# Patient Record
Sex: FEMALE | Race: ASIAN | Employment: OTHER | URBAN - METROPOLITAN AREA
[De-identification: names, ages, dates, MRNs, and addresses within clinical notes are randomized per-mention and may not be internally consistent; named-entity substitution may affect disease eponyms.]

---

## 2017-02-15 ENCOUNTER — APPOINTMENT (OUTPATIENT)
Dept: CT IMAGING | Facility: HOSPITAL | Age: 67
End: 2017-02-15
Attending: EMERGENCY MEDICINE
Payer: COMMERCIAL

## 2017-02-15 ENCOUNTER — APPOINTMENT (OUTPATIENT)
Dept: GENERAL RADIOLOGY | Facility: HOSPITAL | Age: 67
End: 2017-02-15
Attending: EMERGENCY MEDICINE
Payer: COMMERCIAL

## 2017-02-15 ENCOUNTER — HOSPITAL ENCOUNTER (EMERGENCY)
Facility: HOSPITAL | Age: 67
Discharge: HOME | End: 2017-02-15
Attending: EMERGENCY MEDICINE
Payer: COMMERCIAL

## 2017-02-15 VITALS
WEIGHT: 125 LBS | TEMPERATURE: 98 F | OXYGEN SATURATION: 98 % | DIASTOLIC BLOOD PRESSURE: 85 MMHG | HEART RATE: 88 BPM | RESPIRATION RATE: 18 BRPM | SYSTOLIC BLOOD PRESSURE: 132 MMHG | BODY MASS INDEX: 22.15 KG/M2 | HEIGHT: 63 IN
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
## 2017-02-15 DIAGNOSIS — R73.9 HYPERGLYCEMIA: ICD-10-CM

## 2017-02-15 DIAGNOSIS — S00.83XA FACIAL CONTUSION, INITIAL ENCOUNTER: ICD-10-CM

## 2017-02-15 LAB
ALBUMIN SERPL-MCNC: 3.9 G/DL (ref 3.5–4.8)
ALP LIVER SERPL-CCNC: 68 U/L (ref 50–130)
ALT SERPL-CCNC: 28 U/L (ref 14–54)
AST SERPL-CCNC: 29 U/L (ref 15–41)
BASOPHILS # BLD AUTO: 0.08 X10(3) UL (ref 0–0.1)
BASOPHILS NFR BLD AUTO: 1.1 %
BILIRUB SERPL-MCNC: 0.4 MG/DL (ref 0.1–2)
BILIRUB UR QL STRIP.AUTO: NEGATIVE
BUN BLD-MCNC: 14 MG/DL (ref 8–20)
CALCIUM BLD-MCNC: 9.5 MG/DL (ref 8.3–10.3)
CHLORIDE: 105 MMOL/L (ref 101–111)
CLARITY UR REFRACT.AUTO: CLEAR
CO2: 24 MMOL/L (ref 22–32)
COLOR UR AUTO: COLORLESS
CREAT BLD-MCNC: 0.92 MG/DL (ref 0.55–1.02)
EOSINOPHIL # BLD AUTO: 0.28 X10(3) UL (ref 0–0.3)
EOSINOPHIL NFR BLD AUTO: 3.8 %
ERYTHROCYTE [DISTWIDTH] IN BLOOD BY AUTOMATED COUNT: 11.7 % (ref 11.5–16)
GLUCOSE BLD-MCNC: 235 MG/DL (ref 70–99)
GLUCOSE UR STRIP.AUTO-MCNC: NEGATIVE MG/DL
HCT VFR BLD AUTO: 34.8 % (ref 34–50)
HGB BLD-MCNC: 12 G/DL (ref 12–16)
IMMATURE GRANULOCYTE COUNT: 0.03 X10(3) UL (ref 0–1)
IMMATURE GRANULOCYTE RATIO %: 0.4 %
INR BLD: 1.01 (ref 0.89–1.12)
KETONES UR STRIP.AUTO-MCNC: NEGATIVE MG/DL
LEUKOCYTE ESTERASE UR QL STRIP.AUTO: NEGATIVE
LYMPHOCYTES # BLD AUTO: 2.92 X10(3) UL (ref 0.9–4)
LYMPHOCYTES NFR BLD AUTO: 39.6 %
M PROTEIN MFR SERPL ELPH: 7.3 G/DL (ref 6.1–8.3)
MCH RBC QN AUTO: 30.1 PG (ref 27–33.2)
MCHC RBC AUTO-ENTMCNC: 34.5 G/DL (ref 31–37)
MCV RBC AUTO: 87.2 FL (ref 81–100)
MONOCYTES # BLD AUTO: 0.5 X10(3) UL (ref 0.1–0.6)
MONOCYTES NFR BLD AUTO: 6.8 %
NEUTROPHIL ABS PRELIM: 3.57 X10 (3) UL (ref 1.3–6.7)
NEUTROPHILS # BLD AUTO: 3.57 X10(3) UL (ref 1.3–6.7)
NEUTROPHILS NFR BLD AUTO: 48.3 %
NITRITE UR QL STRIP.AUTO: NEGATIVE
PH UR STRIP.AUTO: 7 [PH] (ref 4.5–8)
PLATELET # BLD AUTO: 314 10(3)UL (ref 150–450)
POTASSIUM SERPL-SCNC: 3.9 MMOL/L (ref 3.6–5.1)
PROT UR STRIP.AUTO-MCNC: NEGATIVE MG/DL
PSA SERPL DL<=0.01 NG/ML-MCNC: 13.6 SECONDS (ref 12.3–14.8)
RBC # BLD AUTO: 3.99 X10(6)UL (ref 3.8–5.1)
RBC UR QL AUTO: NEGATIVE
RED CELL DISTRIBUTION WIDTH-SD: 37.6 FL (ref 35.1–46.3)
SODIUM SERPL-SCNC: 138 MMOL/L (ref 136–144)
SP GR UR STRIP.AUTO: <1.005 (ref 1–1.03)
TROPONIN: <0.046 NG/ML (ref ?–0.05)
TSI SER-ACNC: 3.81 MIU/ML (ref 0.35–5.5)
UROBILINOGEN UR STRIP.AUTO-MCNC: <2 MG/DL
WBC # BLD AUTO: 7.4 X10(3) UL (ref 4–13)

## 2017-02-15 PROCEDURE — 76377 3D RENDER W/INTRP POSTPROCES: CPT

## 2017-02-15 PROCEDURE — 85025 COMPLETE CBC W/AUTO DIFF WBC: CPT | Performed by: EMERGENCY MEDICINE

## 2017-02-15 PROCEDURE — 99285 EMERGENCY DEPT VISIT HI MDM: CPT

## 2017-02-15 PROCEDURE — 72125 CT NECK SPINE W/O DYE: CPT

## 2017-02-15 PROCEDURE — 70486 CT MAXILLOFACIAL W/O DYE: CPT

## 2017-02-15 PROCEDURE — 99284 EMERGENCY DEPT VISIT MOD MDM: CPT

## 2017-02-15 PROCEDURE — 93010 ELECTROCARDIOGRAM REPORT: CPT

## 2017-02-15 PROCEDURE — 93005 ELECTROCARDIOGRAM TRACING: CPT

## 2017-02-15 PROCEDURE — 85610 PROTHROMBIN TIME: CPT | Performed by: EMERGENCY MEDICINE

## 2017-02-15 PROCEDURE — 70450 CT HEAD/BRAIN W/O DYE: CPT

## 2017-02-15 PROCEDURE — 84484 ASSAY OF TROPONIN QUANT: CPT | Performed by: EMERGENCY MEDICINE

## 2017-02-15 PROCEDURE — 84443 ASSAY THYROID STIM HORMONE: CPT | Performed by: EMERGENCY MEDICINE

## 2017-02-15 PROCEDURE — 80053 COMPREHEN METABOLIC PANEL: CPT | Performed by: EMERGENCY MEDICINE

## 2017-02-15 PROCEDURE — 81003 URINALYSIS AUTO W/O SCOPE: CPT | Performed by: EMERGENCY MEDICINE

## 2017-02-15 PROCEDURE — 36415 COLL VENOUS BLD VENIPUNCTURE: CPT

## 2017-02-15 RX ORDER — AMOXICILLIN AND CLAVULANATE POTASSIUM 500; 125 MG/1; MG/1
1 TABLET, FILM COATED ORAL 2 TIMES DAILY
COMMUNITY
End: 2020-09-30 | Stop reason: ALTCHOICE

## 2017-02-16 NOTE — ED PROVIDER NOTES
Patient Seen in: [facility] Emergency Department    History   Patient presents with:  Trauma (cardiovascular, musculoskeletal)    Stated Complaint: fall/head inj    HPI    The patient is a 81-year-old female with a history of Parkinson's disease pre agreed except as otherwise stated in HPI.     Physical Exam       ED Triage Vitals   BP 02/15/17 2136 140/86 mmHg   Pulse 02/15/17 2136 94   Resp 02/15/17 2136 18   Temp 02/15/17 2136 97.8 °F (36.6 °C)   Temp src 02/15/17 2136 Oral   SpO2 02/15/17 2136 98 % extension, and forearm pronation and supination. Distal pulses normal and symmetric  Skin: Small periorbital contusion on the right, but no lacerations or abrasions. No masses or nodules or abnormalities.   Psych: Normal interaction, cooperative with exam analgesics but she declined. Urine was collected for urinalysis and culture. Ice pack was applied to the contusion.   [facility]     Ct Brain Or Head ([id number])    2/15/2017  PROCEDURE:  CT BRAIN OR HEAD ([id number])  COMPARISON:  MUKESH THORNE BRAIN NIK SCAN WITH SPECT fell hitting Right side of head. Laceration to Right supraorbital area. FINDINGS:  SINUSES:  Bilateral maxillary mucosal thickening. NASAL FOSSA:   No mass, fracture, or significant septal deviation. SKULL BASE:  No bone destruction.   FACIAL B However she would prefer to defer any imaging as an outpatient with her PCP since she is not tender. She has normal active range of motion of her elbow and shoulder without any pain. I encouraged her to return if she does want x-ray imaging.   She states

## 2017-02-16 NOTE — ED INITIAL ASSESSMENT (HPI)
Fell on R side after losing balance, denies any LOC.  Hx of parkinsons dse. + R facial swelling & R wrist

## 2017-02-17 LAB
ATRIAL RATE: 92 BPM
P AXIS: 73 DEGREES
P-R INTERVAL: 136 MS
Q-T INTERVAL: 370 MS
QRS DURATION: 92 MS
QTC CALCULATION (BEZET): 457 MS
R AXIS: 61 DEGREES
T AXIS: 27 DEGREES
VENTRICULAR RATE: 92 BPM

## 2017-03-13 PROBLEM — G20 PARKINSON DISEASE (HCC): Status: ACTIVE | Noted: 2017-03-13

## 2017-03-13 PROBLEM — G20.A1 PARKINSON DISEASE (HCC): Status: ACTIVE | Noted: 2017-03-13

## 2017-03-13 PROBLEM — G20.A1 PARKINSON DISEASE: Status: ACTIVE | Noted: 2017-03-13

## 2017-12-02 ENCOUNTER — HOSPITAL ENCOUNTER (OUTPATIENT)
Dept: CT IMAGING | Facility: HOSPITAL | Age: 67
Discharge: HOME | End: 2017-12-02
Attending: SPECIALIST
Payer: MEDICARE

## 2017-12-02 DIAGNOSIS — S23.420A: ICD-10-CM

## 2017-12-02 PROCEDURE — 71250 CT THORAX DX C-: CPT | Performed by: SPECIALIST

## 2017-12-12 ENCOUNTER — HOSPITAL ENCOUNTER (OUTPATIENT)
Dept: MAMMOGRAPHY | Age: 67
Discharge: HOME | End: 2017-12-12
Attending: SPECIALIST
Payer: MEDICARE

## 2017-12-12 ENCOUNTER — HOSPITAL ENCOUNTER (OUTPATIENT)
Dept: BONE DENSITY | Age: 67
Discharge: HOME | End: 2017-12-12
Attending: SPECIALIST
Payer: MEDICARE

## 2017-12-12 DIAGNOSIS — Z12.31 ENCOUNTER FOR SCREENING MAMMOGRAM FOR MALIGNANT NEOPLASM OF BREAST: ICD-10-CM

## 2017-12-12 DIAGNOSIS — M81.0 OSTEOPOROSIS: ICD-10-CM

## 2017-12-12 PROCEDURE — 77080 DXA BONE DENSITY AXIAL: CPT | Performed by: SPECIALIST

## 2017-12-12 PROCEDURE — 77067 SCR MAMMO BI INCL CAD: CPT | Performed by: SPECIALIST

## 2018-10-11 ENCOUNTER — HOSPITAL ENCOUNTER (OUTPATIENT)
Dept: GENERAL RADIOLOGY | Age: 68
Discharge: HOME | End: 2018-10-11
Attending: SPECIALIST
Payer: MEDICARE

## 2018-10-11 DIAGNOSIS — M17.0 ARTHRITIS OF BOTH KNEES: ICD-10-CM

## 2018-10-11 PROCEDURE — 73560 X-RAY EXAM OF KNEE 1 OR 2: CPT | Performed by: SPECIALIST

## 2018-10-22 PROBLEM — M17.12 PRIMARY OSTEOARTHRITIS OF LEFT KNEE: Status: ACTIVE | Noted: 2018-10-22

## 2020-01-22 ENCOUNTER — HOSPITAL ENCOUNTER (OUTPATIENT)
Dept: BONE DENSITY | Age: 70
Discharge: HOME | End: 2020-01-22
Attending: SPECIALIST
Payer: MEDICARE

## 2020-01-22 ENCOUNTER — HOSPITAL ENCOUNTER (OUTPATIENT)
Dept: MAMMOGRAPHY | Age: 70
Discharge: HOME | End: 2020-01-22
Attending: SPECIALIST
Payer: MEDICARE

## 2020-01-22 DIAGNOSIS — M81.0 OSTEOPOROSIS: ICD-10-CM

## 2020-01-22 DIAGNOSIS — Z12.31 ENCOUNTER FOR SCREENING MAMMOGRAM FOR MALIGNANT NEOPLASM OF BREAST: ICD-10-CM

## 2020-01-22 PROCEDURE — 77067 SCR MAMMO BI INCL CAD: CPT | Performed by: SPECIALIST

## 2020-01-22 PROCEDURE — 77080 DXA BONE DENSITY AXIAL: CPT | Performed by: SPECIALIST

## 2020-01-22 PROCEDURE — 77063 BREAST TOMOSYNTHESIS BI: CPT | Performed by: SPECIALIST

## 2020-06-13 ENCOUNTER — HOSPITAL ENCOUNTER (OUTPATIENT)
Dept: MRI IMAGING | Facility: HOSPITAL | Age: 70
Discharge: HOME | End: 2020-06-13
Payer: MEDICARE

## 2020-06-13 DIAGNOSIS — M17.12 PRIMARY OSTEOARTHRITIS OF LEFT KNEE: ICD-10-CM

## 2020-06-13 DIAGNOSIS — M25.562 LEFT KNEE PAIN, UNSPECIFIED CHRONICITY: ICD-10-CM

## 2020-06-13 PROCEDURE — 73721 MRI JNT OF LWR EXTRE W/O DYE: CPT | Performed by: ORTHOPAEDIC SURGERY

## 2020-08-03 ENCOUNTER — TELEPHONE (OUTPATIENT)
Dept: PHYSICAL THERAPY | Age: 70
End: 2020-08-03

## 2020-08-05 ENCOUNTER — OFFICE VISIT (OUTPATIENT)
Dept: PHYSICAL THERAPY | Facility: HOSPITAL | Age: 70
End: 2020-08-05
Attending: SPECIALIST
Payer: MEDICARE

## 2020-08-05 PROCEDURE — 97162 PT EVAL MOD COMPLEX 30 MIN: CPT

## 2020-08-05 PROCEDURE — 97110 THERAPEUTIC EXERCISES: CPT

## 2020-08-05 NOTE — PROGRESS NOTES
LOWER EXTREMITY EVALUATION:   Referring Physician: Dr. Lyons Persons  Diagnosis: OA left knee.      Date of Service: 8/5/2020     PATIENT Omid Cannon is a 81year old female who presents to therapy today with complaints of a 3-4 year history of tenderness medial joint line left knee, patellar tendon, and medial border of patella. AROM: (* denotes performed with pain)  TL ROM while standing is wnls and pain-free. Hip Knee Foot/Ankle   Wfls throughout and pain-free.    Flexion: R 125; L 105*  E without UE assist and no pain. Pt will report standing tolerance with ADLs of > 30 minutes. Pt will be pain-free when going up/down a flight of stairs reciprocally. Pt will be independent with an upgraded HEP.      Frequency / Duration: Patient will b

## 2020-08-07 ENCOUNTER — APPOINTMENT (OUTPATIENT)
Dept: PHYSICAL THERAPY | Facility: HOSPITAL | Age: 70
End: 2020-08-07
Attending: SPECIALIST
Payer: MEDICARE

## 2020-08-12 ENCOUNTER — OFFICE VISIT (OUTPATIENT)
Dept: PHYSICAL THERAPY | Facility: HOSPITAL | Age: 70
End: 2020-08-12
Attending: SPECIALIST
Payer: MEDICARE

## 2020-08-12 PROCEDURE — 97140 MANUAL THERAPY 1/> REGIONS: CPT

## 2020-08-12 PROCEDURE — 97110 THERAPEUTIC EXERCISES: CPT

## 2020-08-12 NOTE — PROGRESS NOTES
Dx: OA left knee. Insurance (Authorized # of Visits):  Medicare HMO; 8 visits. Authorizing Physician: Dr. Sixto Mace MD visit: none scheduled/tbd. .. Fall Risk: standard         Precautions:  Hx of Parkinson's dx. ..              Shepherd QS, SLR, seated KF ROM. Charges: Mary Anglin.        Total Timed Treatment: 44 min  Total Treatment Time: 44 min

## 2020-08-14 ENCOUNTER — OFFICE VISIT (OUTPATIENT)
Dept: PHYSICAL THERAPY | Facility: HOSPITAL | Age: 70
End: 2020-08-14
Attending: SPECIALIST
Payer: MEDICARE

## 2020-08-14 PROCEDURE — 97110 THERAPEUTIC EXERCISES: CPT

## 2020-08-14 PROCEDURE — 97140 MANUAL THERAPY 1/> REGIONS: CPT

## 2020-08-14 NOTE — PROGRESS NOTES
Dx: OA left knee. Insurance (Authorized # of Visits):  Medicare HMO; 8 visits. Authorizing Physician: Dr. Serge Mace MD visit: none scheduled/tbd. .. Fall Risk: standard         Precautions:  Hx of Parkinson's dx. ..              Shepherd Supine L SLR x 10. Supine L SLR x 12. DKTC with SB 2 x 10. DKTC with SB 2 x 10. LTR with SB x 10 l/r. LTR with calves on SB x 12 l/r. .        Bridging with calves on SB x 10 reps. . Bridging with calves on SB 3 second holds x 12.

## 2020-08-19 ENCOUNTER — OFFICE VISIT (OUTPATIENT)
Dept: PHYSICAL THERAPY | Facility: HOSPITAL | Age: 70
End: 2020-08-19
Attending: SPECIALIST
Payer: MEDICARE

## 2020-08-19 PROCEDURE — 97140 MANUAL THERAPY 1/> REGIONS: CPT

## 2020-08-19 PROCEDURE — 97110 THERAPEUTIC EXERCISES: CPT

## 2020-08-19 NOTE — PROGRESS NOTES
Dx: OA left knee. Insurance (Authorized # of Visits):  Medicare HMO; 8 visits. Authorizing Physician: Dr. Ricardo Mace MD visit: none scheduled/tbd. .. Fall Risk: standard         Precautions:  Hx of Parkinson's dx. ..              Shepherd IT band. .. Grade II L PF medial and caudal mobs by PT. Grade II L PF medial and caudal mobs by PT. Grade III L PF medial and caudal glides by PT.        QS L:   With folded towel under knee 5 second holds x 10.    Towel under heel 5 second holds x 1

## 2020-08-21 ENCOUNTER — OFFICE VISIT (OUTPATIENT)
Dept: PHYSICAL THERAPY | Facility: HOSPITAL | Age: 70
End: 2020-08-21
Attending: SPECIALIST
Payer: MEDICARE

## 2020-08-21 PROCEDURE — 97110 THERAPEUTIC EXERCISES: CPT

## 2020-08-21 PROCEDURE — 97140 MANUAL THERAPY 1/> REGIONS: CPT

## 2020-08-21 NOTE — PROGRESS NOTES
Dx: OA left knee. Insurance (Authorized # of Visits):  Medicare HMO; 8 visits. Authorizing Physician: Dr. Yanet Mace MD visit: none scheduled/tbd. .. Fall Risk: standard         Precautions:  Hx of Parkinson's dx. ..              Shepherd including HS teondons, IT band, quad muscle. .. X. stm left knee area by PT including HS tendons, quad muscle, IT band. .. stm left knee area by PT for 3-4 minutes. Grade II L PF medial and caudal mobs by PT.   Grade II L PF medial and caudal mobs by PT

## 2020-08-26 ENCOUNTER — OFFICE VISIT (OUTPATIENT)
Dept: PHYSICAL THERAPY | Facility: HOSPITAL | Age: 70
End: 2020-08-26
Attending: SPECIALIST
Payer: MEDICARE

## 2020-08-26 PROCEDURE — 97140 MANUAL THERAPY 1/> REGIONS: CPT

## 2020-08-26 PROCEDURE — 97110 THERAPEUTIC EXERCISES: CPT

## 2020-08-26 NOTE — PROGRESS NOTES
Dx: OA left knee. Insurance (Authorized # of Visits):  Medicare HMO; 8 visits. Authorizing Physician: Dr. Ryan Mace MD visit: none scheduled/tbd. .. Fall Risk: standard         Precautions:  Hx of Parkinson's dx. ..           Disch minutes. Load 5 for 6 minutes. NuStep (7/9) load 5 for 6 minutes. NuStep load 5 for 6 minutes. stm left knee by PT including HS teondons, IT band, quad muscle. .. X. stm left knee area by PT including HS tendons, quad muscle, IT band. .. stm left kne sitting with PT assist.  L KF ROM while supine      L posterior tibial gliding grade II and III by PT while hook lying for 2-3 minutes. Grade III L posterior tibial glides by PT for 2-3 minutes. X.    Supine L KE mobs by PT.    Grade III L posterior tibia

## 2020-08-28 ENCOUNTER — APPOINTMENT (OUTPATIENT)
Dept: PHYSICAL THERAPY | Facility: HOSPITAL | Age: 70
End: 2020-08-28
Attending: SPECIALIST
Payer: MEDICARE

## 2020-09-30 RX ORDER — ACETAMINOPHEN 500 MG
1000 TABLET ORAL ONCE
Status: CANCELLED | OUTPATIENT
Start: 2020-09-30 | End: 2020-09-30

## 2020-10-07 ENCOUNTER — LAB ENCOUNTER (OUTPATIENT)
Dept: LAB | Facility: HOSPITAL | Age: 70
End: 2020-10-07
Attending: STUDENT IN AN ORGANIZED HEALTH CARE EDUCATION/TRAINING PROGRAM
Payer: MEDICARE

## 2020-10-07 DIAGNOSIS — E11.9 DM (DIABETES MELLITUS) (HCC): ICD-10-CM

## 2020-10-07 DIAGNOSIS — M17.0 PRIMARY OSTEOARTHRITIS OF BOTH KNEES: Primary | ICD-10-CM

## 2020-10-07 DIAGNOSIS — Z01.810 PREOP CARDIOVASCULAR EXAM: ICD-10-CM

## 2020-10-07 PROCEDURE — 87081 CULTURE SCREEN ONLY: CPT

## 2020-10-07 PROCEDURE — 85025 COMPLETE CBC W/AUTO DIFF WBC: CPT

## 2020-10-07 PROCEDURE — 36415 COLL VENOUS BLD VENIPUNCTURE: CPT

## 2020-10-07 PROCEDURE — 80053 COMPREHEN METABOLIC PANEL: CPT

## 2020-10-13 ENCOUNTER — LAB ENCOUNTER (OUTPATIENT)
Dept: LAB | Facility: HOSPITAL | Age: 70
End: 2020-10-13
Attending: SPECIALIST
Payer: MEDICARE

## 2020-10-13 DIAGNOSIS — Z01.818 PRE-OP TESTING: ICD-10-CM

## 2020-10-13 PROCEDURE — 86901 BLOOD TYPING SEROLOGIC RH(D): CPT

## 2020-10-13 PROCEDURE — 86850 RBC ANTIBODY SCREEN: CPT

## 2020-10-13 PROCEDURE — 86900 BLOOD TYPING SEROLOGIC ABO: CPT

## 2020-10-15 ENCOUNTER — LAB ENCOUNTER (OUTPATIENT)
Dept: LAB | Facility: HOSPITAL | Age: 70
End: 2020-10-15
Attending: SPECIALIST
Payer: MEDICARE

## 2020-10-15 DIAGNOSIS — E87.6 HYPOKALEMIA: ICD-10-CM

## 2020-10-15 PROCEDURE — 84132 ASSAY OF SERUM POTASSIUM: CPT

## 2020-10-15 PROCEDURE — 36415 COLL VENOUS BLD VENIPUNCTURE: CPT

## 2020-10-16 NOTE — H&P
[facility]  Orthopedic Surgery  HISTORY AND PHYSICAL EXAMINATION    Patient: Phillip Mcdowell  Medical Record Number: [id number]    CHIEF COMPLAINT: Left knee pain    HPI:   Robin  is a 78year old female followed in the office, struggle with disa History:  Social History    Tobacco Use      Smoking status: Never Smoker      Smokeless tobacco: Never Used    Alcohol use: No      Frequency: Never    Drug use: No    Family History:  History reviewed. No pertinent family history.      ALLERGIES:    Ralox

## 2020-10-17 ENCOUNTER — APPOINTMENT (OUTPATIENT)
Dept: LAB | Age: 70
End: 2020-10-17
Attending: ORTHOPAEDIC SURGERY
Payer: MEDICARE

## 2020-10-17 DIAGNOSIS — Z01.818 PRE-OP TESTING: ICD-10-CM

## 2020-10-20 ENCOUNTER — APPOINTMENT (OUTPATIENT)
Dept: GENERAL RADIOLOGY | Facility: HOSPITAL | Age: 70
End: 2020-10-20
Attending: ORTHOPAEDIC SURGERY
Payer: MEDICARE

## 2020-10-20 ENCOUNTER — ANESTHESIA (OUTPATIENT)
Dept: SURGERY | Facility: HOSPITAL | Age: 70
End: 2020-10-20
Payer: MEDICARE

## 2020-10-20 ENCOUNTER — HOSPITAL ENCOUNTER (OUTPATIENT)
Facility: HOSPITAL | Age: 70
Discharge: HOME HEALTH | End: 2020-10-21
Attending: ORTHOPAEDIC SURGERY | Admitting: ORTHOPAEDIC SURGERY
Payer: MEDICARE

## 2020-10-20 ENCOUNTER — ANESTHESIA EVENT (OUTPATIENT)
Dept: SURGERY | Facility: HOSPITAL | Age: 70
End: 2020-10-20
Payer: MEDICARE

## 2020-10-20 DIAGNOSIS — M17.12 PRIMARY OSTEOARTHRITIS OF LEFT KNEE: ICD-10-CM

## 2020-10-20 DIAGNOSIS — Z01.818 PRE-OP TESTING: Primary | ICD-10-CM

## 2020-10-20 PROBLEM — Z47.89 ORTHOPEDIC AFTERCARE: Status: ACTIVE | Noted: 2020-10-20

## 2020-10-20 PROCEDURE — 82962 GLUCOSE BLOOD TEST: CPT

## 2020-10-20 PROCEDURE — 82565 ASSAY OF CREATININE: CPT | Performed by: ORTHOPAEDIC SURGERY

## 2020-10-20 PROCEDURE — 76942 ECHO GUIDE FOR BIOPSY: CPT | Performed by: ANESTHESIOLOGY

## 2020-10-20 PROCEDURE — 88311 DECALCIFY TISSUE: CPT | Performed by: ORTHOPAEDIC SURGERY

## 2020-10-20 PROCEDURE — 73560 X-RAY EXAM OF KNEE 1 OR 2: CPT | Performed by: ORTHOPAEDIC SURGERY

## 2020-10-20 PROCEDURE — 0SRD0J9 REPLACEMENT OF LEFT KNEE JOINT WITH SYNTHETIC SUBSTITUTE, CEMENTED, OPEN APPROACH: ICD-10-PCS | Performed by: ORTHOPAEDIC SURGERY

## 2020-10-20 PROCEDURE — 97530 THERAPEUTIC ACTIVITIES: CPT

## 2020-10-20 PROCEDURE — 88305 TISSUE EXAM BY PATHOLOGIST: CPT | Performed by: ORTHOPAEDIC SURGERY

## 2020-10-20 PROCEDURE — 97161 PT EVAL LOW COMPLEX 20 MIN: CPT

## 2020-10-20 DEVICE — PSN MC VE ASF L 10MM 8-9/CD: Type: IMPLANTABLE DEVICE | Site: KNEE | Status: FUNCTIONAL

## 2020-10-20 DEVICE — PERSONA CM FM/CM TB/VE: Type: IMPLANTABLE DEVICE | Status: FUNCTIONAL

## 2020-10-20 DEVICE — PSN TIB STM 5 DEG SZ D L: Type: IMPLANTABLE DEVICE | Site: KNEE | Status: FUNCTIONAL

## 2020-10-20 DEVICE — PSN ALL POLY PAT PLY 32MM: Type: IMPLANTABLE DEVICE | Site: KNEE | Status: FUNCTIONAL

## 2020-10-20 DEVICE — PSN FEM CR CMT CCR NRW SZ8 L: Type: IMPLANTABLE DEVICE | Site: KNEE | Status: FUNCTIONAL

## 2020-10-20 DEVICE — BIOMET BC R 1X40 US: Type: IMPLANTABLE DEVICE | Site: KNEE | Status: FUNCTIONAL

## 2020-10-20 RX ORDER — NALOXONE HYDROCHLORIDE 0.4 MG/ML
80 INJECTION, SOLUTION INTRAMUSCULAR; INTRAVENOUS; SUBCUTANEOUS AS NEEDED
Status: DISCONTINUED | OUTPATIENT
Start: 2020-10-20 | End: 2020-10-20 | Stop reason: HOSPADM

## 2020-10-20 RX ORDER — LEVOTHYROXINE SODIUM 0.12 MG/1
62.5 TABLET ORAL
COMMUNITY

## 2020-10-20 RX ORDER — PRAVASTATIN SODIUM 20 MG
40 TABLET ORAL NIGHTLY
Status: DISCONTINUED | OUTPATIENT
Start: 2020-10-20 | End: 2020-10-21

## 2020-10-20 RX ORDER — KETOROLAC TROMETHAMINE 15 MG/ML
15 INJECTION, SOLUTION INTRAMUSCULAR; INTRAVENOUS EVERY 6 HOURS
Status: DISCONTINUED | OUTPATIENT
Start: 2020-10-20 | End: 2020-10-21

## 2020-10-20 RX ORDER — DEXTROSE MONOHYDRATE 25 G/50ML
50 INJECTION, SOLUTION INTRAVENOUS
Status: DISCONTINUED | OUTPATIENT
Start: 2020-10-20 | End: 2020-10-20 | Stop reason: HOSPADM

## 2020-10-20 RX ORDER — DEXAMETHASONE SODIUM PHOSPHATE 10 MG/ML
8 INJECTION, SOLUTION INTRAMUSCULAR; INTRAVENOUS ONCE
Status: COMPLETED | OUTPATIENT
Start: 2020-10-21 | End: 2020-10-21

## 2020-10-20 RX ORDER — DOCUSATE SODIUM 100 MG/1
100 CAPSULE, LIQUID FILLED ORAL 2 TIMES DAILY
Status: DISCONTINUED | OUTPATIENT
Start: 2020-10-20 | End: 2020-10-20

## 2020-10-20 RX ORDER — CEFAZOLIN SODIUM/WATER 2 G/20 ML
SYRINGE (ML) INTRAVENOUS
Status: DISPENSED
Start: 2020-10-20 | End: 2020-10-20

## 2020-10-20 RX ORDER — MEPERIDINE HYDROCHLORIDE 25 MG/ML
25 INJECTION INTRAMUSCULAR; INTRAVENOUS; SUBCUTANEOUS
Status: DISCONTINUED | OUTPATIENT
Start: 2020-10-20 | End: 2020-10-20 | Stop reason: HOSPADM

## 2020-10-20 RX ORDER — MIDAZOLAM HYDROCHLORIDE 1 MG/ML
1 INJECTION INTRAMUSCULAR; INTRAVENOUS EVERY 5 MIN PRN
Status: DISCONTINUED | OUTPATIENT
Start: 2020-10-20 | End: 2020-10-20 | Stop reason: HOSPADM

## 2020-10-20 RX ORDER — DEXAMETHASONE SODIUM PHOSPHATE 10 MG/ML
INJECTION, SOLUTION INTRAMUSCULAR; INTRAVENOUS AS NEEDED
Status: DISCONTINUED | OUTPATIENT
Start: 2020-10-20 | End: 2020-10-20 | Stop reason: SURG

## 2020-10-20 RX ORDER — HYDROMORPHONE HYDROCHLORIDE 1 MG/ML
0.2 INJECTION, SOLUTION INTRAMUSCULAR; INTRAVENOUS; SUBCUTANEOUS EVERY 2 HOUR PRN
Status: DISCONTINUED | OUTPATIENT
Start: 2020-10-20 | End: 2020-10-21

## 2020-10-20 RX ORDER — OXYCODONE HYDROCHLORIDE 10 MG/1
10 TABLET ORAL EVERY 4 HOURS PRN
Status: DISCONTINUED | OUTPATIENT
Start: 2020-10-20 | End: 2020-10-21

## 2020-10-20 RX ORDER — DEXTROSE MONOHYDRATE 25 G/50ML
50 INJECTION, SOLUTION INTRAVENOUS
Status: DISCONTINUED | OUTPATIENT
Start: 2020-10-20 | End: 2020-10-21

## 2020-10-20 RX ORDER — TIZANIDINE 2 MG/1
1 TABLET ORAL 3 TIMES DAILY PRN
Status: DISCONTINUED | OUTPATIENT
Start: 2020-10-20 | End: 2020-10-21

## 2020-10-20 RX ORDER — SODIUM CHLORIDE 9 MG/ML
INJECTION, SOLUTION INTRAVENOUS CONTINUOUS
Status: DISCONTINUED | OUTPATIENT
Start: 2020-10-20 | End: 2020-10-21

## 2020-10-20 RX ORDER — ONDANSETRON 2 MG/ML
4 INJECTION INTRAMUSCULAR; INTRAVENOUS AS NEEDED
Status: DISCONTINUED | OUTPATIENT
Start: 2020-10-20 | End: 2020-10-20 | Stop reason: HOSPADM

## 2020-10-20 RX ORDER — DOCUSATE SODIUM 100 MG/1
100 CAPSULE, LIQUID FILLED ORAL 2 TIMES DAILY
Status: DISCONTINUED | OUTPATIENT
Start: 2020-10-20 | End: 2020-10-21

## 2020-10-20 RX ORDER — HYDROMORPHONE HYDROCHLORIDE 1 MG/ML
0.4 INJECTION, SOLUTION INTRAMUSCULAR; INTRAVENOUS; SUBCUTANEOUS EVERY 2 HOUR PRN
Status: DISCONTINUED | OUTPATIENT
Start: 2020-10-20 | End: 2020-10-21

## 2020-10-20 RX ORDER — CEFAZOLIN SODIUM/WATER 2 G/20 ML
2 SYRINGE (ML) INTRAVENOUS ONCE
Status: COMPLETED | OUTPATIENT
Start: 2020-10-20 | End: 2020-10-20

## 2020-10-20 RX ORDER — ACETAMINOPHEN 500 MG
1000 TABLET ORAL ONCE
Status: ON HOLD | COMMUNITY
End: 2020-10-21

## 2020-10-20 RX ORDER — MIDAZOLAM HYDROCHLORIDE 1 MG/ML
INJECTION INTRAMUSCULAR; INTRAVENOUS AS NEEDED
Status: DISCONTINUED | OUTPATIENT
Start: 2020-10-20 | End: 2020-10-20 | Stop reason: SURG

## 2020-10-20 RX ORDER — TRANEXAMIC ACID 10 MG/ML
1000 INJECTION, SOLUTION INTRAVENOUS ONCE
Status: COMPLETED | OUTPATIENT
Start: 2020-10-20 | End: 2020-10-20

## 2020-10-20 RX ORDER — SODIUM CHLORIDE, SODIUM LACTATE, POTASSIUM CHLORIDE, CALCIUM CHLORIDE 600; 310; 30; 20 MG/100ML; MG/100ML; MG/100ML; MG/100ML
INJECTION, SOLUTION INTRAVENOUS CONTINUOUS
Status: DISCONTINUED | OUTPATIENT
Start: 2020-10-20 | End: 2020-10-20 | Stop reason: HOSPADM

## 2020-10-20 RX ORDER — MELATONIN
325
Status: DISCONTINUED | OUTPATIENT
Start: 2020-10-20 | End: 2020-10-21

## 2020-10-20 RX ORDER — TRANEXAMIC ACID 10 MG/ML
INJECTION, SOLUTION INTRAVENOUS
Status: COMPLETED
Start: 2020-10-20 | End: 2020-10-20

## 2020-10-20 RX ORDER — HYDROMORPHONE HYDROCHLORIDE 1 MG/ML
0.5 INJECTION, SOLUTION INTRAMUSCULAR; INTRAVENOUS; SUBCUTANEOUS EVERY 5 MIN PRN
Status: DISCONTINUED | OUTPATIENT
Start: 2020-10-20 | End: 2020-10-20 | Stop reason: HOSPADM

## 2020-10-20 RX ORDER — DIPHENHYDRAMINE HYDROCHLORIDE 50 MG/ML
12.5 INJECTION INTRAMUSCULAR; INTRAVENOUS EVERY 4 HOURS PRN
Status: DISCONTINUED | OUTPATIENT
Start: 2020-10-20 | End: 2020-10-21

## 2020-10-20 RX ORDER — SENNOSIDES 8.6 MG
17.2 TABLET ORAL NIGHTLY
Status: DISCONTINUED | OUTPATIENT
Start: 2020-10-20 | End: 2020-10-21

## 2020-10-20 RX ORDER — CEFAZOLIN SODIUM/WATER 2 G/20 ML
2 SYRINGE (ML) INTRAVENOUS EVERY 8 HOURS
Status: COMPLETED | OUTPATIENT
Start: 2020-10-20 | End: 2020-10-20

## 2020-10-20 RX ORDER — ACETAMINOPHEN 325 MG/1
650 TABLET ORAL ONCE
Status: COMPLETED | OUTPATIENT
Start: 2020-10-20 | End: 2020-10-20

## 2020-10-20 RX ORDER — PRAMIPEXOLE DIHYDROCHLORIDE 0.25 MG/1
0.25 TABLET ORAL 3 TIMES DAILY
Status: DISCONTINUED | OUTPATIENT
Start: 2020-10-20 | End: 2020-10-21

## 2020-10-20 RX ORDER — OXYCODONE HYDROCHLORIDE 15 MG/1
15 TABLET ORAL EVERY 4 HOURS PRN
Status: DISCONTINUED | OUTPATIENT
Start: 2020-10-20 | End: 2020-10-21

## 2020-10-20 RX ORDER — DIPHENHYDRAMINE HYDROCHLORIDE 50 MG/ML
25 INJECTION INTRAMUSCULAR; INTRAVENOUS ONCE AS NEEDED
Status: ACTIVE | OUTPATIENT
Start: 2020-10-20 | End: 2020-10-20

## 2020-10-20 RX ORDER — PROCHLORPERAZINE EDISYLATE 5 MG/ML
10 INJECTION INTRAMUSCULAR; INTRAVENOUS EVERY 6 HOURS PRN
Status: DISCONTINUED | OUTPATIENT
Start: 2020-10-20 | End: 2020-10-21

## 2020-10-20 RX ORDER — SODIUM PHOSPHATE, DIBASIC AND SODIUM PHOSPHATE, MONOBASIC 7; 19 G/133ML; G/133ML
1 ENEMA RECTAL ONCE AS NEEDED
Status: DISCONTINUED | OUTPATIENT
Start: 2020-10-20 | End: 2020-10-21

## 2020-10-20 RX ORDER — ZOLPIDEM TARTRATE 5 MG/1
5 TABLET ORAL NIGHTLY PRN
Status: DISCONTINUED | OUTPATIENT
Start: 2020-10-20 | End: 2020-10-21

## 2020-10-20 RX ORDER — DIPHENHYDRAMINE HCL 25 MG
25 CAPSULE ORAL EVERY 4 HOURS PRN
Status: DISCONTINUED | OUTPATIENT
Start: 2020-10-20 | End: 2020-10-21

## 2020-10-20 RX ORDER — DEXAMETHASONE SODIUM PHOSPHATE 4 MG/ML
4 VIAL (ML) INJECTION AS NEEDED
Status: DISCONTINUED | OUTPATIENT
Start: 2020-10-20 | End: 2020-10-20 | Stop reason: HOSPADM

## 2020-10-20 RX ORDER — ROPIVACAINE HYDROCHLORIDE 5 MG/ML
INJECTION, SOLUTION EPIDURAL; INFILTRATION; PERINEURAL AS NEEDED
Status: DISCONTINUED | OUTPATIENT
Start: 2020-10-20 | End: 2020-10-20 | Stop reason: SURG

## 2020-10-20 RX ORDER — LEVOTHYROXINE SODIUM 0.12 MG/1
62.5 TABLET ORAL
Status: DISCONTINUED | OUTPATIENT
Start: 2020-10-20 | End: 2020-10-21

## 2020-10-20 RX ORDER — ONDANSETRON 2 MG/ML
4 INJECTION INTRAMUSCULAR; INTRAVENOUS EVERY 4 HOURS PRN
Status: DISCONTINUED | OUTPATIENT
Start: 2020-10-20 | End: 2020-10-21

## 2020-10-20 RX ORDER — ACETAMINOPHEN 500 MG
1000 TABLET ORAL 4 TIMES DAILY
Status: DISCONTINUED | OUTPATIENT
Start: 2020-10-20 | End: 2020-10-21

## 2020-10-20 RX ORDER — ACETAMINOPHEN 325 MG/1
TABLET ORAL
Status: COMPLETED
Start: 2020-10-20 | End: 2020-10-20

## 2020-10-20 RX ORDER — OXYCODONE HYDROCHLORIDE 5 MG/1
5 TABLET ORAL EVERY 4 HOURS PRN
Status: DISCONTINUED | OUTPATIENT
Start: 2020-10-20 | End: 2020-10-21

## 2020-10-20 RX ORDER — ASPIRIN 325 MG
325 TABLET ORAL 2 TIMES DAILY
Status: DISCONTINUED | OUTPATIENT
Start: 2020-10-20 | End: 2020-10-21

## 2020-10-20 RX ORDER — HYDROMORPHONE HYDROCHLORIDE 1 MG/ML
0.8 INJECTION, SOLUTION INTRAMUSCULAR; INTRAVENOUS; SUBCUTANEOUS EVERY 2 HOUR PRN
Status: DISCONTINUED | OUTPATIENT
Start: 2020-10-20 | End: 2020-10-21

## 2020-10-20 RX ORDER — POLYETHYLENE GLYCOL 3350 17 G/17G
17 POWDER, FOR SOLUTION ORAL DAILY PRN
Status: DISCONTINUED | OUTPATIENT
Start: 2020-10-20 | End: 2020-10-21

## 2020-10-20 RX ORDER — BUPRENORPHINE HYDROCHLORIDE 0.32 MG/ML
INJECTION INTRAMUSCULAR; INTRAVENOUS AS NEEDED
Status: DISCONTINUED | OUTPATIENT
Start: 2020-10-20 | End: 2020-10-20 | Stop reason: SURG

## 2020-10-20 RX ORDER — BUPIVACAINE HYDROCHLORIDE 7.5 MG/ML
INJECTION, SOLUTION INTRASPINAL AS NEEDED
Status: DISCONTINUED | OUTPATIENT
Start: 2020-10-20 | End: 2020-10-20 | Stop reason: SURG

## 2020-10-20 RX ORDER — BISACODYL 10 MG
10 SUPPOSITORY, RECTAL RECTAL
Status: DISCONTINUED | OUTPATIENT
Start: 2020-10-20 | End: 2020-10-21

## 2020-10-20 RX ADMIN — ROPIVACAINE HYDROCHLORIDE 30 ML: 5 INJECTION, SOLUTION EPIDURAL; INFILTRATION; PERINEURAL at 07:15:00

## 2020-10-20 RX ADMIN — DEXAMETHASONE SODIUM PHOSPHATE 2 MG: 10 INJECTION, SOLUTION INTRAMUSCULAR; INTRAVENOUS at 07:15:00

## 2020-10-20 RX ADMIN — MIDAZOLAM HYDROCHLORIDE 2 MG: 1 INJECTION INTRAMUSCULAR; INTRAVENOUS at 07:02:00

## 2020-10-20 RX ADMIN — CEFAZOLIN SODIUM/WATER 2 G: 2 G/20 ML SYRINGE (ML) INTRAVENOUS at 07:03:00

## 2020-10-20 RX ADMIN — TRANEXAMIC ACID 1000 MG: 10 INJECTION, SOLUTION INTRAVENOUS at 07:15:00

## 2020-10-20 RX ADMIN — BUPIVACAINE HYDROCHLORIDE 1 ML: 7.5 INJECTION, SOLUTION INTRASPINAL at 07:09:00

## 2020-10-20 RX ADMIN — BUPRENORPHINE HYDROCHLORIDE 150 MCG: 0.32 INJECTION INTRAMUSCULAR; INTRAVENOUS at 07:15:00

## 2020-10-20 RX ADMIN — SODIUM CHLORIDE, SODIUM LACTATE, POTASSIUM CHLORIDE, CALCIUM CHLORIDE: 600; 310; 30; 20 INJECTION, SOLUTION INTRAVENOUS at 07:50:00

## 2020-10-20 NOTE — BRIEF OP NOTE
Pre-Operative Diagnosis: Primary osteoarthritis of left knee [M17.12]     Post-Operative Diagnosis: Primary osteoarthritis of left knee [M17.12]      Procedure Performed:   Procedure(s):  LEFT TOTAL KNEE ARTHROPLASTY    Surgeon(s) and Role:     * Sam

## 2020-10-20 NOTE — ANESTHESIA PROCEDURE NOTES
Regional Block  Performed by: Juliana Pollock MD  Authorized by: Juliana Pollock MD       General Information and Staff    Start Time:  10/20/2020 7:11 AM  End Time:  10/20/2020 7:15 AM  Anesthesiologist:  Jojo Tran MD  Performed by:   Anesthesiologis

## 2020-10-20 NOTE — ANESTHESIA PREPROCEDURE EVALUATION
PRE-OP EVALUATION    Patient Name: Yobany Malone    Pre-op Diagnosis: Primary osteoarthritis of left knee [M17.12]    Procedure(s):  LEFT TOTAL KNEE ARTHROPLASTY    Surgeon(s) and Role:     * Biju Ramon MD - Primary    Pre-op vitals reviewed.   Stefani Osuna Omega-3 Fatty Acids (OMEGA-3 OR), Take 2 tablets by mouth daily. , Disp: , Rfl:     •  Calcium Carbonate 600 MG Oral Tab, Take 600 mg by mouth daily.   , Disp: , Rfl:     •  Pravastatin Sodium 40 MG Oral Tab, Take 40 mg by mouth nightly.  , Disp: , Rfl: Cardiovascular    Cardiovascular exam normal.         Dental    No notable dental history. Pulmonary    Pulmonary exam normal.                 Other findings            ASA: 3   Plan: spinal  NPO status verified and patient meets guidelines.     Pos

## 2020-10-20 NOTE — PHYSICAL THERAPY NOTE
PHYSICAL THERAPY KNEE EVALUATION - INPATIENT     Room Number: 915/121-O  Evaluation Date: 10/20/2020  Type of Evaluation: Initial  Physician Order: PT Eval and Treat    Presenting Problem: s/p left TKA on 10/20/20  Reason for Therapy: Mobility Dysfunction following: left knee as documented.      Lower extremity strength is within functional limits except for the following:    Right Knee extension  0/5  Right Knee flexion  0/5  Right Dorsiflexion  3-/5    BALANCE  Static Sitting: Normal  Dynamic Sitting: Norm shift on LLE in [place] and onto BUE's on RW. Mobility as indicated above. T/f to b/s commode with min assist and RW. Indep with perineal care. T/f back to EOB w/min assist and RW. Amb 35 ft as above.      VC's provided for expectations, encouragement, r patient's clinical presentation is stable and overall the evaluation complexity is considered low. These impairments and comorbidities manifest themselves as functional limitations in independent bed mobility, transfers and gait.    The patient is below

## 2020-10-20 NOTE — PLAN OF CARE
Post op plan of care d/w patient and spouse at bedside. Left knee ace wrap c/d/I  Voided x2 upon arrival to floor via bedpan. I.S instructed. Wearing Scd's as ordered. Denies need for pain meds. Warm blanket given for comfort. Goals discussed.  Safety preca

## 2020-10-20 NOTE — ANESTHESIA POSTPROCEDURE EVALUATION
[address] [place] [address] Dr Malone Patient Status:  Outpatient in a Bed   Age/Gender 78year old female MRN [id number]   [facility] 3SW-A Attending Queta Savage MD   Hosp Day # 0 PCP Allie Ingram MD       Anesthesia Post-op Note    P

## 2020-10-20 NOTE — PROGRESS NOTES
[facility]    NON FACE TO FACE VISIT FOR INITIAL ADMIT/ REVIEW OF MEDS/ ORDERS GIVEN/ DISCUSSION WITH ER MD Edgar Partida Patient Status:  Outpatient in a Bed    10/2/1950 MRN [id number]   [facility] 3SW-A Attending Daniel by mouth nightly. •  carbidopa-levodopa  MG Oral Tab, Take 2 tablets by mouth 3 (three) times daily. •  Pramipexole Dihydrochloride 0.25 MG Oral Tab, Take 0.25 mg by mouth 3 (three) times daily.       •  MULTIVITAMIN TAB/CAP, Take 1 tablet b Primary osteoarthritis of left knee     Left total knee arthroplasty  Global 01/18/2021    Primary osteoarthritis of left knee  -s/p Lt knee total knee replacement/arthoplasty  -doing well post op, no significant pain or swelling   -management as per ortho

## 2020-10-20 NOTE — ANESTHESIA PROCEDURE NOTES
Spinal Block  Performed by: Sanjay Pollock MD  Authorized by: Sanjay Pollock MD       General Information and Staff    Start Time:  10/20/2020 7:06 AM  End Time:  10/20/2020 7:09 AM  Anesthesiologist:  Lora Hill MD  Performed by:   Anesthesiologist

## 2020-10-20 NOTE — INTERVAL H&P NOTE
Pre-op Diagnosis: Primary osteoarthritis of left knee [M17.12]    The above referenced H&P was reviewed by MELINDA Esquivel on 10/20/2020, the patient was examined and no significant changes have occurred in the patient's condition since the H&P was per

## 2020-10-21 VITALS
RESPIRATION RATE: 18 BRPM | DIASTOLIC BLOOD PRESSURE: 60 MMHG | OXYGEN SATURATION: 98 % | TEMPERATURE: 97 F | HEIGHT: 62.5 IN | BODY MASS INDEX: 24.05 KG/M2 | WEIGHT: 134.06 LBS | HEART RATE: 89 BPM | SYSTOLIC BLOOD PRESSURE: 110 MMHG

## 2020-10-21 PROCEDURE — 97535 SELF CARE MNGMENT TRAINING: CPT

## 2020-10-21 PROCEDURE — 83036 HEMOGLOBIN GLYCOSYLATED A1C: CPT | Performed by: STUDENT IN AN ORGANIZED HEALTH CARE EDUCATION/TRAINING PROGRAM

## 2020-10-21 PROCEDURE — 82962 GLUCOSE BLOOD TEST: CPT

## 2020-10-21 PROCEDURE — 97110 THERAPEUTIC EXERCISES: CPT

## 2020-10-21 PROCEDURE — 97530 THERAPEUTIC ACTIVITIES: CPT

## 2020-10-21 PROCEDURE — 97165 OT EVAL LOW COMPLEX 30 MIN: CPT

## 2020-10-21 PROCEDURE — 97116 GAIT TRAINING THERAPY: CPT

## 2020-10-21 RX ORDER — HYDROCODONE BITARTRATE AND ACETAMINOPHEN 5; 325 MG/1; MG/1
1 TABLET ORAL EVERY 4 HOURS PRN
Status: DISCONTINUED | OUTPATIENT
Start: 2020-10-21 | End: 2020-10-21

## 2020-10-21 RX ORDER — HYDROCODONE BITARTRATE AND ACETAMINOPHEN 5; 325 MG/1; MG/1
2 TABLET ORAL EVERY 4 HOURS PRN
Status: DISCONTINUED | OUTPATIENT
Start: 2020-10-21 | End: 2020-10-21

## 2020-10-21 RX ORDER — ACETAMINOPHEN 325 MG/1
650 TABLET ORAL EVERY 4 HOURS PRN
Status: DISCONTINUED | OUTPATIENT
Start: 2020-10-21 | End: 2020-10-21

## 2020-10-21 NOTE — PROGRESS NOTES
[facility] INTERNIST  Progress Note     Madeline Peer Patient Status:  Outpatient in a Bed    10/2/1950 MRN [id number]   [facility] 3SW-A Attending Stanislaw Sheridan MD   Hosp Day # 0 PCP Aleshia Rizo MD     Chief Complaint: s/p lt k and HS   • carbidopa-levodopa  2 tablet Oral TID   • Levothyroxine Sodium  62.5 mcg Oral Before breakfast   • Pramipexole Dihydrochloride  0.25 mg Oral TID   • Pravastatin Sodium  40 mg Oral Nightly       ASSESSMENT / PLAN:   Primary osteoarthritis of left

## 2020-10-21 NOTE — OCCUPATIONAL THERAPY NOTE
OCCUPATIONAL THERAPY QUICK EVALUATION - INPATIENT    Room Number: 742/221-Z  Evaluation Date: 10/21/2020     Type of Evaluation: Quick Eval  Presenting Problem: s/p L TKA 10/20/20    Physician Order: IP Consult to Occupational Therapy  Reason for Therapy: AND STRENGTH ASSESSMENT  Upper extremity ROM is within functional limits     Upper extremity strength is within functional limits     Reports had problems with RUE years ago more for arthritis than Parkinson's but functional now, reports tremor also improv SBA) with good verbal understanding. PPE worn by therapist this session: Surgical mask, gloves. Patient End of Session: Up in chair;Needs met;Call light within reach; All patient questions and concerns addressed;SCDs in place; Ice applied; Alarm set; Fa reports will have supervision at home  Patient/Caregiver able to demonstrate safety with ADLS: At supervision level or above; patient reports will have supervision at home

## 2020-10-21 NOTE — PLAN OF CARE
Pt ambulating with min assist and walker. RA, VSS. ADA/vegetarian diet. Tolerating well. MOM given this am. Denies pain, no additional pain medications given besides scheduled meds. CPM. Ice gel and ace wrap to incision site, c/d/I. Plan TBD.  Will continue

## 2020-10-21 NOTE — PLAN OF CARE
Problem: Impaired Activities of Daily Living  Goal: Achieve highest/safest level of independence in self care  Description: Interventions:  - Assess ability and encourage patient to participate in ADLs to maximize function  - Promote sitting position [facility]

## 2020-10-21 NOTE — CM/SW NOTE
Met with pt to discuss DC plan for [facility] services and accepting [facility] providers. Pt agreeable with [facility]HC at [place]. [facility] reserved  SW spoke with Arin Dwyer from [employer] who confirmed pt will be seen for [facility] services. CPM arranged through [employer].   No further DC

## 2020-10-21 NOTE — OPERATIVE REPORT
[facility]    PATIENT'S NAME: Komal Grigsby   ATTENDING PHYSICIAN: Venita Haskins M.D. OPERATING PHYSICIAN: Venita Haskins M.D.    PATIENT ACCOUNT#:   [de-identified]    LOCATION:  3SW-A [facility]  MEDICAL RECORD #:   [id number]       DATE OF WILVER femur is positioned. A 10 MC poly is placed. The knee is reduced. Full extension, soft tissue balance is perceived. Patellar tracking is excellent. Patellar thickness is 20 mm after resection and resurfacing with a 32 mm patella.   Patellar thickness i

## 2020-10-21 NOTE — PLAN OF CARE
A&O x4. VSS. Room air. Denies any pain. Ambulating with mod assist w/knee immobilizer. Bilateral SCD's. CPM.  L knee aqualcel dressing, ace wrap, gel ice. Reviewed POC, pain management, IS use, and fall precautions with pt. PT/OT to see tomorrow.  Possib

## 2020-10-21 NOTE — PHYSICAL THERAPY NOTE
PHYSICAL THERAPY KNEE TREATMENT NOTE - INPATIENT     Room Number: 899/754-P     Session: 1   Number of Visits to Meet Established Goals: 2    Presenting Problem: s/p left TKA on 10/20/20    Problem List  Principal Problem:    Primary osteoarthritis of lef Modifier (G-Code): CI    FUNCTIONAL ABILITY STATUS  Gait Assessment   Gait Assistance: Supervision  Distance (ft): 300  Assistive Device: Rolling walker  Pattern: L Decreased stance time(tends to step to far forward in RW)  Stoop/Curb Assistance: [employer] criteria for skilled inpatient physical therapy services, however patient will continue to benefit from QID ambulation with nursing staff/  Pt is hereby discharged from [address] RN aware to re-order if there is a decline in mobility status.        D

## 2020-10-21 NOTE — PROGRESS NOTES
[facility]  Orthopedic Surgery  Progress Note    Berneda Sandhoff Patient Status:  Outpatient in a Bed    10/2/1950 MRN [id number]   [facility] 3SW-A Attending May Palencia MD   Hosp Day # 0 PCP Andre Berger MD     [address] [place] [address] HEALTH. 5. Continue medical management  6.  Follow up in office with Felicita Dash MD in 2 weeks      David Traore MD  10/21/2020  7:04 AM

## 2020-10-21 NOTE — CM/SW NOTE
10/21/20 0900   CM/SW Referral Data   Referral Source Social Work (self-referral)   Reason for Referral Discharge planning   Discharge Needs   Anticipated D/C needs Medical equipment;Home health care       HOME SITUATION  Type of Home: [facility]

## 2020-10-30 ENCOUNTER — TELEPHONE (OUTPATIENT)
Dept: PHYSICAL THERAPY | Age: 70
End: 2020-10-30

## 2020-11-05 ENCOUNTER — TELEPHONE (OUTPATIENT)
Dept: PHYSICAL THERAPY | Facility: HOSPITAL | Age: 70
End: 2020-11-05

## 2020-11-06 ENCOUNTER — OFFICE VISIT (OUTPATIENT)
Dept: PHYSICAL THERAPY | Facility: HOSPITAL | Age: 70
End: 2020-11-06
Attending: ORTHOPAEDIC SURGERY
Payer: MEDICARE

## 2020-11-06 DIAGNOSIS — M17.12 PRIMARY OSTEOARTHRITIS OF LEFT KNEE: ICD-10-CM

## 2020-11-06 DIAGNOSIS — Z01.818 PRE-OP EXAMINATION: ICD-10-CM

## 2020-11-06 PROCEDURE — 97110 THERAPEUTIC EXERCISES: CPT

## 2020-11-06 PROCEDURE — 97161 PT EVAL LOW COMPLEX 20 MIN: CPT

## 2020-11-06 PROCEDURE — 97140 MANUAL THERAPY 1/> REGIONS: CPT

## 2020-11-06 NOTE — PROGRESS NOTES
POST-OP KNEE EVALUATION:   Referring Physician: Dr. Aruna Fritz  Diagnosis: L TKA     Date of Service: 11/6/2020     PATIENT SUMMARY   Colt Mcallister is a 78year old female who presents to therapy today s/p L TKA on 10/20 with complaints of swelling, to physical therapy evaluation with primary c/o knee pain and swelling s/p L TKA on 10/20. The results of the objective tests and measures show limited knee AROM and PROM, post-operative swelling, gait impairments.   Functional deficits include but are not therex x 1, manual x 1      Total Timed Treatment: 25 min     Total Treatment Time: 45 min     PLAN OF CARE:    Goals: (To be met in 12 visits)   · Pt will improve knee extension ROM to 0 deg to allow proper heel strike during gait and terminal knee extens 11/6/2020  To:2/4/2021

## 2020-11-10 ENCOUNTER — OFFICE VISIT (OUTPATIENT)
Dept: PHYSICAL THERAPY | Facility: HOSPITAL | Age: 70
End: 2020-11-10
Attending: SPECIALIST
Payer: MEDICARE

## 2020-11-10 PROCEDURE — 97110 THERAPEUTIC EXERCISES: CPT

## 2020-11-10 PROCEDURE — 97140 MANUAL THERAPY 1/> REGIONS: CPT

## 2020-11-10 NOTE — PROGRESS NOTES
Dx: L TKA         Authorized # of Visits:  12         Next MD visit: none scheduled  Fall Risk: standard         Precautions: n/a             Subjective: Patient reports feeling stiff today. In general she has been feeling better.  She does her exercises 2x training, 3 min         Nustep, lv 5, 5 min         Manual:  Knee PROM, 6 min  Light distraction, 3 min                    Charges: Violet 2( 35 min) manual x 1 ( 9 min)   Total Timed Treatment: 44 min     Total Treatment Time: 44 min

## 2020-11-12 ENCOUNTER — OFFICE VISIT (OUTPATIENT)
Dept: PHYSICAL THERAPY | Facility: HOSPITAL | Age: 70
End: 2020-11-12
Attending: ORTHOPAEDIC SURGERY
Payer: MEDICARE

## 2020-11-12 PROCEDURE — 97140 MANUAL THERAPY 1/> REGIONS: CPT

## 2020-11-12 PROCEDURE — 97110 THERAPEUTIC EXERCISES: CPT

## 2020-11-13 NOTE — PROGRESS NOTES
Dx: L TKA         Authorized # of Visits:  12         Next MD visit: 12/9/20  Fall Risk: standard         Precautions: n/a             Subjective:  Left knee pain is rated 5-6/10 when walking into PT dept this evening.     Is reportedly taking Norco BID, an flexion Bridging with calves on SB x 10 x 2. SLR, 2x5 Supine L SLR 2 x 5. Tandem stance, 2 min L KF with SB and strap use x 10 reps. .  DKTC with SB x 10. Gait training, 3 min Seated L KF with red band around heel x 10 reps. Alonzo Leon

## 2020-11-16 ENCOUNTER — OFFICE VISIT (OUTPATIENT)
Dept: PHYSICAL THERAPY | Facility: HOSPITAL | Age: 70
End: 2020-11-16
Attending: ORTHOPAEDIC SURGERY
Payer: MEDICARE

## 2020-11-16 PROCEDURE — 97110 THERAPEUTIC EXERCISES: CPT

## 2020-11-16 PROCEDURE — 97140 MANUAL THERAPY 1/> REGIONS: CPT

## 2020-11-16 NOTE — PROGRESS NOTES
Dx: L TKA         Authorized # of Visits:  12         Next MD visit: 12/9/20  Fall Risk: standard         Precautions: n/a             Subjective:  Left knee pain is rated 4-5/10 when walking into PT dept this morning.     Is reportedly taking Norco at [employer], towel under knee 5 seconds x 10. QS:   X 10 with towel under knee and x 10 under heel. SB hamstring isometric, [id number] L HSS while hook lying with ankle pumps 2 x 10. X 20 pumps. SB knee flexion Bridging with calves on SB x 10 x 2.   Jose

## 2020-11-20 ENCOUNTER — OFFICE VISIT (OUTPATIENT)
Dept: PHYSICAL THERAPY | Facility: HOSPITAL | Age: 70
End: 2020-11-20
Attending: ORTHOPAEDIC SURGERY
Payer: MEDICARE

## 2020-11-20 PROCEDURE — 97110 THERAPEUTIC EXERCISES: CPT

## 2020-11-20 PROCEDURE — 97140 MANUAL THERAPY 1/> REGIONS: CPT

## 2020-11-20 NOTE — PROGRESS NOTES
Dx: L TKA         Authorized # of Visits:  12         Next MD visit: 12/9/20  Fall Risk: standard         Precautions: Parkinson's dx. .        Subjective:  Left knee pain is rated 5/10 when walking into PT dept this morning. It ranges from 3-8/10.   Is r seconds x 10. QS:   X 10 with towel under knee and x 10 under heel. X 10 each position. stm left knee area by PT.       SB hamstring isometric, [id number] L HSS while hook lying with ankle pumps 2 x 10. X 20 pumps.   L HSS while hook lying with ankle

## 2020-11-24 ENCOUNTER — OFFICE VISIT (OUTPATIENT)
Dept: PHYSICAL THERAPY | Facility: HOSPITAL | Age: 70
End: 2020-11-24
Attending: ORTHOPAEDIC SURGERY
Payer: MEDICARE

## 2020-11-24 PROCEDURE — 97110 THERAPEUTIC EXERCISES: CPT

## 2020-11-24 PROCEDURE — 97140 MANUAL THERAPY 1/> REGIONS: CPT

## 2020-11-24 NOTE — PROGRESS NOTES
Dx: L TKA         Authorized # of Visits:  12         Next MD visit: 12/9/20  Fall Risk: standard         Precautions: Parkinson's dx. .        Subjective:  Left knee pain is rated 4/10 when walking into PT dept this morning.     It ranges from 4-8/10, and i Tx#: 7/ Date: Tx#: 8/   Seated knee extension, 2x5 2 x 5. Seated active KE 2 x 5.    .  Seated active KE x 10 L/  Seated A L KE x 12 reps. .     Slight knee flexion to quad set, [id number] QS with folded towel under knee 5 seconds x 10.    With folded towe behind knee x 10 reps; and as HEP. Wide balance board a/p x 20.   4\" LSU x 12 L. Airex DL standing:    L CKC TKES with red band 3 second holds x 10. Marching x 10 l/r. PSD x 10 l/r. Charges: Clarence Mijuan.    Total Timed Treatment: 43 min

## 2020-11-30 ENCOUNTER — OFFICE VISIT (OUTPATIENT)
Dept: PHYSICAL THERAPY | Facility: HOSPITAL | Age: 70
End: 2020-11-30
Attending: ORTHOPAEDIC SURGERY
Payer: MEDICARE

## 2020-11-30 PROCEDURE — 97140 MANUAL THERAPY 1/> REGIONS: CPT

## 2020-11-30 PROCEDURE — 97110 THERAPEUTIC EXERCISES: CPT

## 2020-11-30 NOTE — PROGRESS NOTES
Dx: L TKA         Authorized # of Visits:  12         Next MD visit: 12/9/20  Fall Risk: standard         Precautions: Parkinson's dx. .        Subjective:  Left knee pain is rated 2/10 when walking into PT dept this morning; it was a 4/10 at last PT visit. 7/12 Date: Tx#: 8/       Seated knee extension, 2x5 2 x 5. Seated active KE 2 x 5.    .  Seated active KE x 10 L/  Seated A L KE x 12 reps. . X 15 reps. Slight knee flexion to quad set, 44b9kcw QS with folded towel under knee 5 seconds x 10.    Robbie Bank KF by PT. Seated L KF by PT with gentle distraction. Wide balance board a/p x 20.   4\" LSU L x 10. L CKC TKEs with red band x 10. Shuttle DLS 90# x 10 x 2.  4\" ASU L with HHA prn x 10 reps. .  Balance board a/p x 20.    CK TKEs with red band

## 2020-12-02 ENCOUNTER — OFFICE VISIT (OUTPATIENT)
Dept: PHYSICAL THERAPY | Facility: HOSPITAL | Age: 70
End: 2020-12-02
Attending: ORTHOPAEDIC SURGERY
Payer: MEDICARE

## 2020-12-02 PROCEDURE — 97110 THERAPEUTIC EXERCISES: CPT

## 2020-12-02 PROCEDURE — 97140 MANUAL THERAPY 1/> REGIONS: CPT

## 2020-12-02 NOTE — PROGRESS NOTES
Dx: L TKA         Authorized # of Visits:  12         Next MD visit: 12/9/20  Fall Risk: standard         Precautions: Parkinson's dx. .        Subjective:  Left knee pain is rated 2-3/10 when walking into PT dept this morning.   It was a 1/10 during most of 2x5 2 x 5. Seated active KE 2 x 5.    .  Seated active KE x 10 L/  Seated A L KE x 12 reps. . X 15 reps. Seated A L KE x 15. Slight knee flexion to quad set, [id number] QS with folded towel under knee 5 seconds x 10.    With folded towel under knee 5 s Supine L KF ROM by PT. Supine left KE mobs and passive stretching by PT. Supine L KF by PT. Supine passive L KE mobs and KE stretching by PT. Seated L KF by PT with gentle distraction. Wide balance board a/p x 20.   4\" LSU L x 10.    L CKC T

## 2020-12-07 ENCOUNTER — APPOINTMENT (OUTPATIENT)
Dept: PHYSICAL THERAPY | Facility: HOSPITAL | Age: 70
End: 2020-12-07
Attending: ORTHOPAEDIC SURGERY
Payer: MEDICARE

## 2020-12-09 ENCOUNTER — OFFICE VISIT (OUTPATIENT)
Dept: PHYSICAL THERAPY | Facility: HOSPITAL | Age: 70
End: 2020-12-09
Attending: ORTHOPAEDIC SURGERY
Payer: MEDICARE

## 2020-12-09 PROCEDURE — 97140 MANUAL THERAPY 1/> REGIONS: CPT

## 2020-12-09 PROCEDURE — 97110 THERAPEUTIC EXERCISES: CPT

## 2020-12-09 NOTE — PROGRESS NOTES
Dx: L TKA         Authorized # of Visits:  12         Next MD visit: 12/9/20  Fall Risk: standard         Precautions: Parkinson's dx. .        Subjective:  Left knee pain is \"not bad\" this morning and is rated 2/10.     It is reportedly worse when getting 12/9/20  #9      Seated knee extension, 2x5 2 x 5. Seated active KE 2 x 5.    .  Seated active KE x 10 L/  Seated A L KE x 12 reps. . X 15 reps. Seated A L KE x 15. Seated active L KE x 15.        Slight knee flexion to quad set, 94k5hiw QS with folded to PT.   Supine L KE grade II mobs by PT.  L knee PROM while sitting and supine KE and KF. Supine grade II L KE mobs by PT. Supine left KF PROM by PT. Supine L KE stretching by PT. Supine Grade III L KE mobs by PT.   Supine L KE mobs and stretching by P

## 2020-12-11 ENCOUNTER — OFFICE VISIT (OUTPATIENT)
Dept: PHYSICAL THERAPY | Facility: HOSPITAL | Age: 70
End: 2020-12-11
Attending: ORTHOPAEDIC SURGERY
Payer: MEDICARE

## 2020-12-11 PROCEDURE — 97110 THERAPEUTIC EXERCISES: CPT

## 2020-12-11 PROCEDURE — 97140 MANUAL THERAPY 1/> REGIONS: CPT

## 2020-12-11 NOTE — PROGRESS NOTES
Dx: L TKA         Authorized # of Visits:  12         Next MD visit: 12/9/20  Fall Risk: standard         Precautions: Parkinson's dx. .        Subjective:  Left knee feels stiff each morning. Still notes difficulty sleeping comfortably.     Is reportedly 5.    .  Seated active KE x 10 L/  Seated A L KE x 12 reps. . X 15 reps. Seated A L KE x 15. Seated active L KE x 15. Seated active KE x 15. Slight knee flexion to quad set, [id number] QS with folded towel under knee 5 seconds x 10.    With folded towel distraction, 3 min Knee PROM in supine and in sitting by PT. Supine L KE grade II mobs by PT.  L knee PROM while sitting and supine KE and KF. Supine grade II L KE mobs by PT. Supine left KF PROM by PT. Supine L KE stretching by PT.    Supine Grade I

## 2020-12-16 ENCOUNTER — OFFICE VISIT (OUTPATIENT)
Dept: PHYSICAL THERAPY | Facility: HOSPITAL | Age: 70
End: 2020-12-16
Attending: ORTHOPAEDIC SURGERY
Payer: MEDICARE

## 2020-12-16 PROCEDURE — 97140 MANUAL THERAPY 1/> REGIONS: CPT

## 2020-12-16 PROCEDURE — 97110 THERAPEUTIC EXERCISES: CPT

## 2020-12-16 NOTE — PROGRESS NOTES
Dx: L TKA         Authorized # of Visits:  12         Next MD visit: 12/9/20  Fall Risk: standard         Precautions: Parkinson's dx. .        Subjective:  Left knee feels stiff this morning after 2 long car rides yesterday.     Rates left knee soreness rat Seated knee extension, 2x5 2 x 5. Seated active KE 2 x 5.    .  Seated active KE x 10 L/  Seated A L KE x 12 reps. . X 15 reps. Seated A L KE x 15. Seated active L KE x 15. Seated active KE x 15. X 15 reps. .    Slight knee flexion to quad set, 10x3se load 5 for 6 minutes. NuStep load 5 for 6 minutes. NuStep load 5 for 6 minutes. NuStep (7/9) load 5 for 6 minutes. Load 5 for 6 minutes. Manual:  Knee PROM, 6 min  Light distraction, 3 min Knee PROM in supine and in sitting by PT.    Supine L LAKSHMI grad Airex big marching x 15 l/r. Lisa Mesa Charges: Marisa Myrick.    Total Timed Treatment: 44 min     Total Treatment Time: 44 min

## 2020-12-18 ENCOUNTER — OFFICE VISIT (OUTPATIENT)
Dept: PHYSICAL THERAPY | Facility: HOSPITAL | Age: 70
End: 2020-12-18
Attending: ORTHOPAEDIC SURGERY
Payer: MEDICARE

## 2020-12-18 PROCEDURE — 97140 MANUAL THERAPY 1/> REGIONS: CPT

## 2020-12-18 PROCEDURE — 97110 THERAPEUTIC EXERCISES: CPT

## 2020-12-18 NOTE — PROGRESS NOTES
Dx: L TKA         Authorized # of Visits:  12         Next MD visit: 12/9/20  Fall Risk: standard         Precautions: Parkinson's dx. .         Progress Summary  Pt has attended 12 visits in Physical Therapy to date.      Subjective:   Rates left knee mallika 4+/5 to be able to get up and down from the floor safely. Not tested so far. · Pt will improve SLS to >10s to improve safety and independence with gait on uneven surfaces such as grass. Progress.    · Pt will be independent and compliant with comprehens 10 each position. SB hamstring isometric, [id number] L HSS while hook lying with ankle pumps 2 x 10. X 20 pumps. L HSS while hook lying with ankle pumps x 20. L HSS while hook lying with 20 ankle pumps. L HSS with 20 ankle pumps. X 21 pumps.   X 20 rep PT.   Supine L KF ROM by PT. Supine left KE mobs and passive stretching by PT. Supine L KF by PT. Supine passive L KE mobs and KE stretching by PT. Supine passive KE stretching and KE mobs by PT. Supine KE strethcing and KE mobs by PT.   X. Supine lef

## 2020-12-21 ENCOUNTER — APPOINTMENT (OUTPATIENT)
Dept: PHYSICAL THERAPY | Facility: HOSPITAL | Age: 70
End: 2020-12-21
Attending: ORTHOPAEDIC SURGERY
Payer: MEDICARE

## 2020-12-23 ENCOUNTER — OFFICE VISIT (OUTPATIENT)
Dept: PHYSICAL THERAPY | Facility: HOSPITAL | Age: 70
End: 2020-12-23
Attending: ORTHOPAEDIC SURGERY
Payer: MEDICARE

## 2020-12-23 DIAGNOSIS — Z01.818 PRE-OP EXAMINATION: ICD-10-CM

## 2020-12-23 DIAGNOSIS — M17.12 PRIMARY OSTEOARTHRITIS OF LEFT KNEE: ICD-10-CM

## 2020-12-23 PROCEDURE — 97140 MANUAL THERAPY 1/> REGIONS: CPT

## 2020-12-23 PROCEDURE — 97110 THERAPEUTIC EXERCISES: CPT

## 2020-12-28 ENCOUNTER — OFFICE VISIT (OUTPATIENT)
Dept: PHYSICAL THERAPY | Facility: HOSPITAL | Age: 70
End: 2020-12-28
Attending: ORTHOPAEDIC SURGERY
Payer: MEDICARE

## 2020-12-28 PROCEDURE — 97110 THERAPEUTIC EXERCISES: CPT

## 2020-12-28 PROCEDURE — 97140 MANUAL THERAPY 1/> REGIONS: CPT

## 2020-12-28 NOTE — PROGRESS NOTES
Dx: L TKA         Authorized # of Visits:  6 add'l approved. Next MD visit: 12/9/20  Fall Risk: standard         Precautions: Parkinson's dx. .           Subjective:   Rates left knee soreness rated 1-2/10/1his morning and over the past weekend.    Not PLOF        Date: 11/10/2020  Tx#: 2/12 Date: Tx#: 3/12 Date: 11/16/20  Tx#: 4/12 Date: 11/20/20  Tx#: 5/12 Date: 11/24/20  Tx#: 6/12 Date11/30/20:    Tx#: 7/12 Date: 12/2/20  Tx#: 8/12 12/9/20  #9 12/11/20  #10 12/16/20  #11/12 12/18/20  #12/12 12/23/20 min L KF with SB and strap use x 10 reps. .  DKTC with SB x 10. DKTC with SB x 10. L KF with heel oh SB with strap DKTC with SB 2 x 10. DKTC with SB x 20 reps. . X 20 reps. . X 20. DKTC with SB x 10.  2 x 10. X 20. X 20 reps. . - X 10 reps. .   Gait Bucky Fernandez Marching x 10 l/r. PSD x 10 l/r. A/P x 20.   4\" and 6\" LSU L x 10. Prone active L KF and then prone hang for 2 minutes. L CKC with red band for 3 seconds x 2 each. Wide balance board a/p x 25.   6\" LSU x 10.   Airex marching with light HHA x 20

## 2020-12-30 ENCOUNTER — OFFICE VISIT (OUTPATIENT)
Dept: PHYSICAL THERAPY | Facility: HOSPITAL | Age: 70
End: 2020-12-30
Attending: ORTHOPAEDIC SURGERY
Payer: MEDICARE

## 2020-12-30 PROCEDURE — 97140 MANUAL THERAPY 1/> REGIONS: CPT

## 2020-12-30 PROCEDURE — 97110 THERAPEUTIC EXERCISES: CPT

## 2020-12-30 NOTE — PROGRESS NOTES
Dx: L TKA         Authorized # of Visits:  6 add'l approved. Next MD visit: \"in January\". Fall Risk: standard         Precautions: Parkinson's dx. .           Subjective:   Rates left knee soreness rated 0-1/10/1his morning.    Notes going up/luther Date: 11/16/20  Tx#: 4/12 Date: 11/20/20  Tx#: 5/12 Date: 11/24/20  Tx#: 6/12 Date11/30/20: Tx#: 7/12 Date: 12/2/20  Tx#: 8/12 12/9/20  #9 12/11/20  #10 12/16/20  #11/12 12/18/20  #12/12 12/23/20  #1/6 add'l. . 12/28/20  #2/6 add'l. .. 12/30/20  #3/6. .. Supine L SLR x 20. Supine L SLR x 20. X 20 L. X 20 with fatigue reported. X 20 L. X. X 20 L.  HEP. Tandem stance, 2 min L KF with SB and strap use x 10 reps. .  DKTC with SB x 10. DKTC with SB x 10.    L KF with heel oh SB with strap DKTC with SB 2 x 10 reps. .  Balance board a/p x 20. CKC TKEs with red band behind knee x 10 reps; and as HEP. Wide balance board a/p x 20.   4\" LSU x 12 L. Airex DL standing:    L CKC TKES with red band 3 second holds x 10. Marching x 10 l/r. PSD x 10 l/r.   A/

## 2021-01-05 ENCOUNTER — OFFICE VISIT (OUTPATIENT)
Dept: PHYSICAL THERAPY | Facility: HOSPITAL | Age: 71
End: 2021-01-05
Attending: ORTHOPAEDIC SURGERY
Payer: MEDICARE

## 2021-01-05 PROCEDURE — 97110 THERAPEUTIC EXERCISES: CPT

## 2021-01-05 PROCEDURE — 97140 MANUAL THERAPY 1/> REGIONS: CPT

## 2021-01-05 NOTE — PROGRESS NOTES
Dx: L TKA         Authorized # of Visits:  6 add'l approved. Next MD visit: \"in January\". Fall Risk: standard         Precautions: Parkinson's dx. .           Subjective:   Rates left knee soreness rated 0-1/10/1his morning.    Notes going up/luther 8/12 12/9/20  #9 12/11/20  #10 12/16/20  #11/12 12/18/20  #12/12 12/23/20  #1/6 add'l. . 12/28/20  #2/6 add'l. .. 12/30/20  #3/6. .. 1/5/21  #4/6 add'l   Seated knee extension, 2x5 2 x 5.   Seated active KE 2 x 5.    .  Seated active KE x 10 L/  Seated A L KE SLR x 20. Supine L SLR x 20. X 20 L. X 20 with fatigue reported. X 20 L. X. X 20 L.  HEP.  -   Tandem stance, 2 min L KF with SB and strap use x 10 reps. .  DKTC with SB x 10. DKTC with SB x 10. L KF with heel oh SB with strap DKTC with SB 2 x 10.   D 10.  Shuttle DLS 02# x 10 x 2.  4\" ASU L with HHA prn x 10 reps. .  Balance board a/p x 20. CKC TKEs with red band behind knee x 10 reps; and as HEP. Wide balance board a/p x 20.   4\" LSU x 12 L.     Airex DL standing:    L CKC TKES with red band 3 seco Charges: Ramy Tony.    Total Timed Treatment: 45 min     Total Treatment Time: 45 min

## 2021-01-08 ENCOUNTER — APPOINTMENT (OUTPATIENT)
Dept: PHYSICAL THERAPY | Facility: HOSPITAL | Age: 71
End: 2021-01-08
Attending: ORTHOPAEDIC SURGERY
Payer: MEDICARE

## 2021-01-12 ENCOUNTER — OFFICE VISIT (OUTPATIENT)
Dept: PHYSICAL THERAPY | Facility: HOSPITAL | Age: 71
End: 2021-01-12
Attending: ORTHOPAEDIC SURGERY
Payer: MEDICARE

## 2021-01-12 PROCEDURE — 97140 MANUAL THERAPY 1/> REGIONS: CPT

## 2021-01-12 PROCEDURE — 97110 THERAPEUTIC EXERCISES: CPT

## 2021-01-12 NOTE — PROGRESS NOTES
Dx: L TKA         Authorized # of Visits:  6 add'l approved. Next MD visit: \"in January\". Fall Risk: standard         Precautions: Parkinson's dx. .           Subjective:   Rates left knee soreness rated 1/10 when walking into the clinic.    Repo 12/18/20  #12/12 12/23/20  #1/6 add'l. . 12/28/20  #2/6 add'l. .. 12/30/20  #3/6. .. 1/5/21  #4/6 add'l 1/12/21  #5/6 add'l... Seated knee extension, 2x5 2 x 5. Seated active KE 2 x 5.    .  Seated active KE x 10 L/  Seated A L KE x 12 reps. . X 15 reps. 10.   Bridging without SB 2 x 10. SLR, 2x5 Supine L SLR 2 x 5. Supine L SLR x 10. Supine L SLR x 12. Supine L SLR x 15 reps. . X 19 reps. . Supine L SLR x 20. Supine L SLR x 20. X 20 L. X 20 with fatigue reported. X 20 L.   X. X 20 L.  HEP.  - Supine stretching by PT. Supine passive KE stretching and KE mobs by PT. Supine KE strethcing and KE mobs by PT. X. Supine left KE ROM and KE mobs by PT.  X  X. X. X. X.     Seated L KF by PT with gentle distraction.   Wide balance board a/p x 20.   4\" LSU L x blue band 3 seconds x 10. Shuttle DLS 01# 2 x 10. Bike seat 1 level 1 for 2 minutes. DL HR on step x 12. X 25 a/p rocking. 8\" LSU x 12 L. X 10 reps with cueing. Forward lunges x 10 l/r with HHA; and as HEP.       DL HR on step

## 2021-01-14 ENCOUNTER — TELEPHONE (OUTPATIENT)
Dept: PHYSICAL THERAPY | Facility: HOSPITAL | Age: 71
End: 2021-01-14

## 2021-01-15 ENCOUNTER — APPOINTMENT (OUTPATIENT)
Dept: PHYSICAL THERAPY | Facility: HOSPITAL | Age: 71
End: 2021-01-15
Attending: ORTHOPAEDIC SURGERY
Payer: MEDICARE

## 2021-01-18 ENCOUNTER — APPOINTMENT (OUTPATIENT)
Dept: PHYSICAL THERAPY | Facility: HOSPITAL | Age: 71
End: 2021-01-18
Attending: ORTHOPAEDIC SURGERY
Payer: MEDICARE

## 2021-01-19 ENCOUNTER — APPOINTMENT (OUTPATIENT)
Dept: PHYSICAL THERAPY | Facility: HOSPITAL | Age: 71
End: 2021-01-19
Attending: ORTHOPAEDIC SURGERY
Payer: MEDICARE

## 2021-01-20 ENCOUNTER — OFFICE VISIT (OUTPATIENT)
Dept: PHYSICAL THERAPY | Facility: HOSPITAL | Age: 71
End: 2021-01-20
Attending: ORTHOPAEDIC SURGERY
Payer: MEDICARE

## 2021-01-20 PROCEDURE — 97140 MANUAL THERAPY 1/> REGIONS: CPT

## 2021-01-20 PROCEDURE — 97110 THERAPEUTIC EXERCISES: CPT

## 2021-01-22 ENCOUNTER — APPOINTMENT (OUTPATIENT)
Dept: PHYSICAL THERAPY | Facility: HOSPITAL | Age: 71
End: 2021-01-22
Attending: ORTHOPAEDIC SURGERY
Payer: MEDICARE

## 2021-01-22 ENCOUNTER — TELEPHONE (OUTPATIENT)
Dept: PHYSICAL THERAPY | Facility: HOSPITAL | Age: 71
End: 2021-01-22

## 2021-01-25 ENCOUNTER — APPOINTMENT (OUTPATIENT)
Dept: PHYSICAL THERAPY | Facility: HOSPITAL | Age: 71
End: 2021-01-25
Attending: ORTHOPAEDIC SURGERY
Payer: MEDICARE

## 2021-01-28 ENCOUNTER — TELEPHONE (OUTPATIENT)
Dept: PHYSICAL THERAPY | Facility: HOSPITAL | Age: 71
End: 2021-01-28

## 2021-02-03 ENCOUNTER — OFFICE VISIT (OUTPATIENT)
Dept: PHYSICAL THERAPY | Facility: HOSPITAL | Age: 71
End: 2021-02-03
Attending: ORTHOPAEDIC SURGERY
Payer: MEDICARE

## 2021-02-03 PROCEDURE — 97110 THERAPEUTIC EXERCISES: CPT

## 2021-02-03 NOTE — PROGRESS NOTES
Dx: L TKA         Authorized # of Visits:  25         Next MD visit: 12/9/20  Fall Risk: standard         Precautions: Parkinson's dx. .        Subjective:     Patient is walking up to 1 mile which takes 25 min.  She feels that is getting a little bit better remaining strength, flexibility, and balance impairments in order to maximize functional gains. Date: 11/10/2020  Tx#: 2/12 Date: Tx#: 3/12 Date: 11/16/20  Tx#: 4/12 Date: 11/20/20  Tx#: 5/12 Date: 11/24/20  Tx#: 6/12 Date11/30/20:    Tx#: 7/12 Da reps.. X 19 reps. . Supine L SLR x 20. Supine L SLR x 20. X 20 L. X 20 with fatigue reported. X 20 L. Supine knee extension PROM Lateral walk, green TB, 2 laps     Tandem stance, 2 min L KF with SB and strap use x 10 reps. .  DKTC with SB x 10.   DKTC wit 10 reps. .  Balance board a/p x 20. CKC TKEs with red band behind knee x 10 reps; and as HEP. Wide balance board a/p x 20.   4\" LSU x 12 L. Airex DL standing:    L CKC TKES with red band 3 second holds x 10. Marching x 10 l/r. PSD x 10 l/r.   A/P

## 2021-02-10 ENCOUNTER — OFFICE VISIT (OUTPATIENT)
Dept: PHYSICAL THERAPY | Facility: HOSPITAL | Age: 71
End: 2021-02-10
Attending: ORTHOPAEDIC SURGERY
Payer: MEDICARE

## 2021-02-10 PROCEDURE — 97110 THERAPEUTIC EXERCISES: CPT

## 2021-02-10 NOTE — PROGRESS NOTES
Dx: L TKA         Authorized # of Visits:  25         Next MD visit: 12/9/20  Fall Risk: standard         Precautions: Parkinson's dx. .        Subjective:     Patient reports continued improvement with walking and she is able to achieve better knee extensi Date: 11/24/20  Tx#: 6/12 Date11/30/20: Tx#: 7/12 Date: 12/2/20  Tx#: 8/12 12/9/20  #9 12/11/20  #10 12/16/20  #11/12 12/18/20  #12/12 1/20/21  Tx# 16/16 Date: 2/3/21  Tx#: 17 Date: 2/10/21  Tx#: 18    Seated knee extension, 2x5 2 x 5.   Seated active KE Supine knee extension PROM Lateral walk, green TB, 2 laps Heel raise off step, 10      Tandem stance, 2 min L KF with SB and strap use x 10 reps. .  DKTC with SB x 10. DKTC with SB x 10. L KF with heel oh SB with strap DKTC with SB 2 x 10.   DKTC with SB 2.  4\" ASU L with HHA prn x 10 reps. .  Balance board a/p x 20. CKC TKEs with red band behind knee x 10 reps; and as HEP. Wide balance board a/p x 20.   4\" LSU x 12 L. Airex DL standing:    L CKC TKES with red band 3 second holds x 10.    Marching x

## 2021-02-17 ENCOUNTER — OFFICE VISIT (OUTPATIENT)
Dept: PHYSICAL THERAPY | Facility: HOSPITAL | Age: 71
End: 2021-02-17
Attending: ORTHOPAEDIC SURGERY
Payer: MEDICARE

## 2021-02-17 PROCEDURE — 97110 THERAPEUTIC EXERCISES: CPT

## 2021-02-17 NOTE — PROGRESS NOTES
Dx: L TKA         Authorized # of Visits:  25         Next MD visit: 12/9/20  Fall Risk: standard         Precautions: Parkinson's dx. .        Subjective:     Patient has not been able to walk recently due to bad weather but has been doing her exercises at in order to maximize functional gains. Date: 11/10/2020  Tx#: 2/12 Date: Tx#: 3/12 Date: 11/16/20  Tx#: 4/12 Date: 11/20/20  Tx#: 5/12 Date: 11/24/20  Tx#: 6/12 Date11/30/20:    Tx#: 7/12 Date: 12/2/20  Tx#: 8/12 12/9/20  #9 12/11/20  #10 12/16/20 without SB use. Prone quad stretch, [id number] FSU onto airex to SLS, 3' Forward lunge, 2x10 S/l hip IR, 2x10   SLR, 2x5 Supine L SLR 2 x 5. Supine L SLR x 10. Supine L SLR x 12. Supine L SLR x 15 reps. . X 19 reps. . Supine L SLR x 20. Supine L SLR x 20.   X Supine L KF by PT. Supine passive L KE mobs and KE stretching by PT. Supine passive KE stretching and KE mobs by PT. Supine KE strethcing and KE mobs by PT. X. Supine left KE ROM and KE mobs by PT.   Functional testing Standing TKE, blue TB, 25 Supine

## 2021-02-22 ENCOUNTER — LAB ENCOUNTER (OUTPATIENT)
Dept: LAB | Age: 71
End: 2021-02-22
Attending: SPECIALIST
Payer: MEDICARE

## 2021-02-22 DIAGNOSIS — D64.9 ANEMIA, UNSPECIFIED: ICD-10-CM

## 2021-02-22 DIAGNOSIS — E11.65 TYPE II DIABETES MELLITUS, UNCONTROLLED (HCC): Primary | ICD-10-CM

## 2021-02-22 DIAGNOSIS — E78.2 MIXED HYPERLIPIDEMIA: ICD-10-CM

## 2021-02-23 ENCOUNTER — TELEPHONE (OUTPATIENT)
Dept: PHYSICAL THERAPY | Facility: HOSPITAL | Age: 71
End: 2021-02-23

## 2021-02-24 ENCOUNTER — OFFICE VISIT (OUTPATIENT)
Dept: PHYSICAL THERAPY | Facility: HOSPITAL | Age: 71
End: 2021-02-24
Attending: ORTHOPAEDIC SURGERY
Payer: MEDICARE

## 2021-02-24 PROCEDURE — 97110 THERAPEUTIC EXERCISES: CPT

## 2021-02-24 NOTE — PROGRESS NOTES
Dx: L TKA         Authorized # of Visits:  25         Next MD visit: 12/9/20  Fall Risk: standard         Precautions: Parkinson's dx. .        Subjective:     Patient reports that 2 days ago she was bending over to pick something off the floor and felt sha independence with gait on uneven surfaces such as grass. Met on R , not met on L  · Pt will be independent and compliant with comprehensive HEP to maintain progress achieved in PT.  Met. FOTO:  PFS has improved from 31 to 44 since initial evaluation. reps.. X 20 reps. . X 20 reps. . L HSS with 20 ankle pumps.   Partial ROM lunge, 3x8 Forward lunge onto 6\" step, 2x10 airex tandem walk, 4 laps S/l clam manual resistance, 2x15 Standing hip abduction, red TB, 2x10      SB knee flexion Bridging with calves on 6 minutes. NuStep (7/9) load 5 for 6 minutes. Load 5 for 6 minutes. NuStep load 5 for 6 minutes.    Gait training, 3 min Passive knee flexion, extension, 10 min Retro lunge to march, 2x5       Manual:  Knee PROM, 6 min  Light distraction, 3 min Knee PROM 10, trunk rotation x 10 l/r, marching x 10 l/r, PSD x 10 l/r. [place] DL HR on step x 10. Wooden balance board a/p x 30.   6\" LSU x 15 L.   4\" ASU/Over x 10 L. DL HR on step x 10. Airex big marching x 15 l/r. .   -    See progress note.                Leana

## 2021-03-05 ENCOUNTER — OFFICE VISIT (OUTPATIENT)
Dept: PHYSICAL THERAPY | Facility: HOSPITAL | Age: 71
End: 2021-03-05
Attending: ORTHOPAEDIC SURGERY
Payer: MEDICARE

## 2021-03-05 PROCEDURE — 97140 MANUAL THERAPY 1/> REGIONS: CPT

## 2021-03-05 PROCEDURE — 97110 THERAPEUTIC EXERCISES: CPT

## 2021-03-05 NOTE — PROGRESS NOTES
Dx: L TKA         Authorized # of Visits:  25         Next MD visit: 12/9/20  Fall Risk: standard         Precautions: Parkinson's dx. .        Subjective:     Patient reports that her back is feeling better but she is finding it hard to do her knee exercis safety and independence with gait on uneven surfaces such as grass. Met on R , not met on L  · Pt will be independent and compliant with comprehensive HEP to maintain progress achieved in PT.  Met.      FOTO:  PFS has improved from 31 to 44 since initial e HSS while hook lying with 20 ankle pumps. L HSS with 20 ankle pumps. X 21 pumps. X 20 reps. . X 20 reps. . X 20 reps. . L HSS with 20 ankle pumps.   Partial ROM lunge, 3x8 Forward lunge onto 6\" step, 2x10 airex tandem walk, 4 laps S/l clam manual resistanc 5 min NuStep (7/9) L 5 for 5 minuutes. NuStep load 5 for 6 minutes. NuStep (7/9) load 5 for 6 minutes. Load 4 for 6 minutes. NuStep load 5 for 6 minutes. NuStep load 5 for 6 minutes. NuStep load 5 for 6 minutes. NuStep (7/9) load 5 for 6 minutes.   Yolette Ken cueing given. DL HR on step x 10. L CKC TKEs 3 second holds x 12.   [place] balance board a/p x 30. L KF stretching on a 6\" step. 6\" LSU x 15 total reps. .  4\" ASU/Over with HHA L and R x 10 each.     Airex DL standing:   Arm liftis x 10, trunk ro

## 2021-03-12 ENCOUNTER — OFFICE VISIT (OUTPATIENT)
Dept: PHYSICAL THERAPY | Facility: HOSPITAL | Age: 71
End: 2021-03-12
Attending: ORTHOPAEDIC SURGERY
Payer: MEDICARE

## 2021-03-12 PROCEDURE — 97140 MANUAL THERAPY 1/> REGIONS: CPT

## 2021-03-12 PROCEDURE — 97110 THERAPEUTIC EXERCISES: CPT

## 2021-03-12 NOTE — PROGRESS NOTES
Dx: L TKA         Authorized # of Visits:  25         Next MD visit: 12/9/20  Fall Risk: standard         Precautions: Parkinson's dx. .        Subjective:     Patient states that she feels stiff in her L knee when she is not moving after about an hour.  Whe not met on L  · Pt will be independent and compliant with comprehensive HEP to maintain progress achieved in PT.  Met. FOTO:  PFS has improved from 31 to 44 since initial evaluation.      Plan: Continue PT to address remaining strength, flexibility, and ankle pumps x 20. L HSS while hook lying with 20 ankle pumps. L HSS with 20 ankle pumps. X 21 pumps. X 20 reps. . X 20 reps. . X 20 reps. . L HSS with 20 ankle pumps.   Partial ROM lunge, 3x8 Forward lunge onto 6\" step, 2x10 airex tandem walk, 4 laps S/l knee flexion and extension, blue TB, 2x10 each Prone press ups, 10x10 sec Tandem walk, 2 laps     Nustep, lv 5, 5 min NuStep (7/9) L 5 for 5 minuutes. NuStep load 5 for 6 minutes. NuStep (7/9) load 5 for 6 minutes. Load 4 for 6 minutes.   NuStep load 5 fo CKC with red band for 10 reps. . Wide balance board ap x 25.   6\" LSU L x 12.   4\" ASU/Over x 10 l/r with HHA and cueing given. DL HR on step x 10. L CKC TKEs 3 second holds x 12.   [place] balance board a/p x 30. L KF stretching on a 6\" step.     6\

## 2021-03-22 ENCOUNTER — OFFICE VISIT (OUTPATIENT)
Dept: PHYSICAL THERAPY | Facility: HOSPITAL | Age: 71
End: 2021-03-22
Attending: ORTHOPAEDIC SURGERY
Payer: MEDICARE

## 2021-03-22 PROCEDURE — 97140 MANUAL THERAPY 1/> REGIONS: CPT

## 2021-03-22 PROCEDURE — 97110 THERAPEUTIC EXERCISES: CPT

## 2021-03-22 NOTE — PROGRESS NOTES
Dx: L TKA         Authorized # of Visits:  25         Next MD visit: 12/9/20  Fall Risk: standard         Precautions: Parkinson's dx. .        Subjective:     Patient reports that her knee is doing well.  She has been able to walk 2-3 miles with very little PT.  Met. FOTO:  PFS has improved from 31 to 44 since initial evaluation. Plan: Continue PT to address remaining strength, flexibility, and balance impairments in order to maximize functional gains. Date: 11/10/2020  Tx#: 2/12 Date:    Tx#: lying with 20 ankle pumps. L HSS with 20 ankle pumps. X 21 pumps. X 20 reps. . X 20 reps. . X 20 reps. . L HSS with 20 ankle pumps.   Partial ROM lunge, 3x8 Forward lunge onto 6\" step, 2x10 airex tandem walk, 4 laps S/l clam manual resistance, 2x15 Standin training, 5 min Prone quad stretch, 3x30 sec Forward step down, 6\" step, 3x5 Seated knee flexion and extension, blue TB, 2x10 each Prone press ups, 10x10 sec Tandem walk, 2 laps  Patellar sup/inf glide in full knee flexion, 5 min   Nustep, lv 5, 5 min NuS seconds x 2 each. Wide balance board a/p x 25.   6\" LSU x 10. Airex marching with light HHA x 20 l/r. [facility] Airex PSD x 10 l/r. .  L CKC with red band for 10 reps. . Wide balance board ap x 25.   6\" LSU L x 12.   4\" ASU/Over x 10 l/r with HHA and cueing g

## 2021-04-05 ENCOUNTER — ORDER TRANSCRIPTION (OUTPATIENT)
Dept: ADMINISTRATIVE | Facility: HOSPITAL | Age: 71
End: 2021-04-05

## 2021-04-05 ENCOUNTER — OFFICE VISIT (OUTPATIENT)
Dept: PHYSICAL THERAPY | Facility: HOSPITAL | Age: 71
End: 2021-04-05
Attending: ORTHOPAEDIC SURGERY
Payer: MEDICARE

## 2021-04-05 DIAGNOSIS — R20.0 NUMBNESS OF HAND: Primary | ICD-10-CM

## 2021-04-05 DIAGNOSIS — M19.079 PRIMARY LOCALIZED OSTEOARTHROSIS, ANKLE AND FOOT: ICD-10-CM

## 2021-04-05 PROCEDURE — 97110 THERAPEUTIC EXERCISES: CPT

## 2021-04-05 NOTE — PROGRESS NOTES
Discharge Summary  Pt has attended 24 visits in Physical Therapy. Dx: L TKA         Authorized # of Visits:  24         Next MD visit: 12/9/20  Fall Risk: standard         Precautions: Parkinson's dx. .        Subjective:     Patient reports that she has Met other than L hip abduction (4+/5)  · Pt will improve SLS to >10s to improve safety and independence with gait on uneven surfaces such as grass. Met  · Pt will be independent and compliant with comprehensive HEP to maintain progress achieved in PT.   Me hamstring isometric, [id number] L HSS while hook lying with ankle pumps 2 x 10. X 20 pumps. L HSS while hook lying with ankle pumps x 20. L HSS while hook lying with 20 ankle pumps. L HSS with 20 ankle pumps. X 21 pumps. X 20 reps. . X 20 reps. . X 20 rep knee flexion, 5 min Tib/fem AP with green strap in hook lying, 5 min   Gait training, 3 min Seated L KF with red band around heel x 10 reps. . X 10 reps. . X 10. Seated L KF with PT assist.  X. Supine L KF by PT. Supine L KF ROM by PT.   Supine L KF ROM by PT board a/p x 20. CKC TKEs with red band behind knee x 10 reps; and as HEP. Wide balance board a/p x 20.   4\" LSU x 12 L. Airex DL standing:    L CKC TKES with red band 3 second holds x 10. Marching x 10 l/r. PSD x 10 l/r.   A/P x 20.   4\" and 6\"

## 2021-04-07 ENCOUNTER — HOSPITAL ENCOUNTER (OUTPATIENT)
Dept: GENERAL RADIOLOGY | Age: 71
Discharge: HOME | End: 2021-04-07
Attending: SPECIALIST
Payer: MEDICARE

## 2021-04-07 DIAGNOSIS — M19.079: ICD-10-CM

## 2021-04-07 PROCEDURE — 73630 X-RAY EXAM OF FOOT: CPT | Performed by: SPECIALIST

## 2021-05-20 ENCOUNTER — HOSPITAL ENCOUNTER (OUTPATIENT)
Dept: GENERAL RADIOLOGY | Age: 71
Discharge: HOME | End: 2021-05-20
Attending: SPECIALIST
Payer: MEDICARE

## 2021-05-20 DIAGNOSIS — Z96.652 S/P TOTAL KNEE REPLACEMENT, LEFT: ICD-10-CM

## 2021-05-20 PROCEDURE — 73560 X-RAY EXAM OF KNEE 1 OR 2: CPT | Performed by: SPECIALIST

## 2021-06-08 ENCOUNTER — OFFICE VISIT (OUTPATIENT)
Dept: ELECTROPHYSIOLOGY | Facility: HOSPITAL | Age: 71
End: 2021-06-08
Attending: SPECIALIST
Payer: MEDICARE

## 2021-06-08 DIAGNOSIS — R20.0 NUMBNESS OF HAND: ICD-10-CM

## 2021-06-08 DIAGNOSIS — M19.079 PRIMARY LOCALIZED OSTEOARTHROSIS, ANKLE AND FOOT: ICD-10-CM

## 2021-06-08 PROCEDURE — 95911 NRV CNDJ TEST 9-10 STUDIES: CPT | Performed by: OTHER

## 2021-06-08 PROCEDURE — 95885 MUSC TST DONE W/NERV TST LIM: CPT | Performed by: OTHER

## 2021-06-08 PROCEDURE — 95886 MUSC TEST DONE W/N TEST COMP: CPT | Performed by: OTHER

## 2021-06-08 NOTE — PROCEDURES
[facility]  Nerve Conduction & EMG Report                      Cathi Carter, [address]   EMG department   [address] [facility] [address], [address] [place] [address]  [phone]          Patient name: Manuel Hutton  Date of mine

## 2021-07-31 ENCOUNTER — LAB ENCOUNTER (OUTPATIENT)
Dept: LAB | Age: 71
End: 2021-07-31
Attending: SPECIALIST
Payer: MEDICARE

## 2021-07-31 DIAGNOSIS — R10.10 UPPER ABDOMINAL PAIN: Primary | ICD-10-CM

## 2021-07-31 LAB
ALBUMIN SERPL-MCNC: 4.3 G/DL (ref 3.4–5)
ALBUMIN/GLOB SERPL: 1.5 {RATIO} (ref 1–2)
ALP LIVER SERPL-CCNC: 61 U/L
ALT SERPL-CCNC: 34 U/L
ANION GAP SERPL CALC-SCNC: 7 MMOL/L (ref 0–18)
AST SERPL-CCNC: 24 U/L (ref 15–37)
BASOPHILS # BLD AUTO: 0.06 X10(3) UL (ref 0–0.2)
BASOPHILS NFR BLD AUTO: 0.7 %
BILIRUB SERPL-MCNC: 0.8 MG/DL (ref 0.1–2)
BUN BLD-MCNC: 13 MG/DL (ref 7–18)
CALCIUM BLD-MCNC: 9.4 MG/DL (ref 8.5–10.1)
CHLORIDE SERPL-SCNC: 106 MMOL/L (ref 98–112)
CHOLEST SMN-MCNC: 165 MG/DL (ref ?–200)
CO2 SERPL-SCNC: 24 MMOL/L (ref 21–32)
CREAT BLD-MCNC: 0.62 MG/DL
CRP SERPL-MCNC: <0.29 MG/DL (ref ?–0.3)
EOSINOPHIL # BLD AUTO: 0.23 X10(3) UL (ref 0–0.7)
EOSINOPHIL NFR BLD AUTO: 2.9 %
ERYTHROCYTE [DISTWIDTH] IN BLOOD BY AUTOMATED COUNT: 11.9 %
GLOBULIN PLAS-MCNC: 2.9 G/DL (ref 2.8–4.4)
GLUCOSE BLD-MCNC: 126 MG/DL (ref 70–99)
HCT VFR BLD AUTO: 42.4 %
HDLC SERPL-MCNC: 54 MG/DL (ref 40–59)
HGB BLD-MCNC: 13.6 G/DL
IMM GRANULOCYTES # BLD AUTO: 0.04 X10(3) UL (ref 0–1)
IMM GRANULOCYTES NFR BLD: 0.5 %
LDLC SERPL CALC-MCNC: 78 MG/DL (ref ?–100)
LYMPHOCYTES # BLD AUTO: 2.5 X10(3) UL (ref 1–4)
LYMPHOCYTES NFR BLD AUTO: 31.1 %
M PROTEIN MFR SERPL ELPH: 7.2 G/DL (ref 6.4–8.2)
MCH RBC QN AUTO: 29.5 PG (ref 26–34)
MCHC RBC AUTO-ENTMCNC: 32.1 G/DL (ref 31–37)
MCV RBC AUTO: 92 FL
MONOCYTES # BLD AUTO: 0.66 X10(3) UL (ref 0.1–1)
MONOCYTES NFR BLD AUTO: 8.2 %
NEUTROPHILS # BLD AUTO: 4.56 X10 (3) UL (ref 1.5–7.7)
NEUTROPHILS # BLD AUTO: 4.56 X10(3) UL (ref 1.5–7.7)
NEUTROPHILS NFR BLD AUTO: 56.6 %
NONHDLC SERPL-MCNC: 111 MG/DL (ref ?–130)
OSMOLALITY SERPL CALC.SUM OF ELEC: 286 MOSM/KG (ref 275–295)
PLATELET # BLD AUTO: 291 10(3)UL (ref 150–450)
POTASSIUM SERPL-SCNC: 4.2 MMOL/L (ref 3.5–5.1)
RBC # BLD AUTO: 4.61 X10(6)UL
SODIUM SERPL-SCNC: 137 MMOL/L (ref 136–145)
TRIGL SERPL-MCNC: 200 MG/DL (ref 30–149)
VLDLC SERPL CALC-MCNC: 31 MG/DL (ref 0–30)
WBC # BLD AUTO: 8.1 X10(3) UL (ref 4–11)

## 2021-07-31 PROCEDURE — 86140 C-REACTIVE PROTEIN: CPT

## 2021-07-31 PROCEDURE — 80061 LIPID PANEL: CPT

## 2021-07-31 PROCEDURE — 85025 COMPLETE CBC W/AUTO DIFF WBC: CPT

## 2021-07-31 PROCEDURE — 36415 COLL VENOUS BLD VENIPUNCTURE: CPT

## 2021-07-31 PROCEDURE — 80053 COMPREHEN METABOLIC PANEL: CPT

## 2021-10-18 ENCOUNTER — HOSPITAL ENCOUNTER (OUTPATIENT)
Dept: MAMMOGRAPHY | Age: 71
Discharge: HOME | End: 2021-10-18
Attending: SPECIALIST
Payer: MEDICARE

## 2021-10-18 DIAGNOSIS — Z12.31 BREAST CANCER SCREENING BY MAMMOGRAM: ICD-10-CM

## 2021-10-18 PROCEDURE — 77063 BREAST TOMOSYNTHESIS BI: CPT | Performed by: SPECIALIST

## 2021-10-18 PROCEDURE — 77067 SCR MAMMO BI INCL CAD: CPT | Performed by: SPECIALIST

## 2021-12-03 ENCOUNTER — OFFICE VISIT (OUTPATIENT)
Dept: PODIATRY CLINIC | Facility: CLINIC | Age: 71
End: 2021-12-03
Payer: MEDICARE

## 2021-12-03 DIAGNOSIS — B35.1 ONYCHOMYCOSIS: ICD-10-CM

## 2021-12-03 DIAGNOSIS — G20 PARKINSON DISEASE (HCC): ICD-10-CM

## 2021-12-03 DIAGNOSIS — M20.21 HALLUX RIGIDUS OF RIGHT FOOT: ICD-10-CM

## 2021-12-03 DIAGNOSIS — M20.11 HALLUX VALGUS (ACQUIRED), RIGHT FOOT: Primary | ICD-10-CM

## 2021-12-03 PROCEDURE — 99203 OFFICE O/P NEW LOW 30 MIN: CPT | Performed by: STUDENT IN AN ORGANIZED HEALTH CARE EDUCATION/TRAINING PROGRAM

## 2021-12-03 PROCEDURE — 11721 DEBRIDE NAIL 6 OR MORE: CPT | Performed by: STUDENT IN AN ORGANIZED HEALTH CARE EDUCATION/TRAINING PROGRAM

## 2021-12-03 NOTE — PATIENT INSTRUCTIONS
-Continue ambulating with supportive shoe gear.  -If pain flares up, can consider Medrol Dosepak or steroid injection.  -Can also consider surgical intervention such as first metatarsophalangeal joint implant if symptoms worsen in the future.

## 2021-12-03 NOTE — PROGRESS NOTES
[facility] Podiatry  Progress Note    Gary Meza is a 73year old female.  Patient presents with:  New Patient: [place] on right foot, pain on top of great toe.pain level 7/10        HPI:     Patient is a pleasant 42-year-old female presents to clinic history on file.    Social History    Socioeconomic History      Marital status:     Tobacco Use      Smoking status: Never Smoker      Smokeless tobacco: Never Used    Vaping Use      Vaping Use: Never used    Substance and Sexual Activity      Alco noted first metatarsophalangeal joints consistent with hallux rigidus  -Discussed etiology of patient's condition including conservative and surgical treatment options.  -Surgically, patient may be a good candidate for implant to first metatarsophalangeal

## 2021-12-09 ENCOUNTER — ORDER TRANSCRIPTION (OUTPATIENT)
Dept: ADMINISTRATIVE | Facility: HOSPITAL | Age: 71
End: 2021-12-09

## 2021-12-09 DIAGNOSIS — G56.03 CARPAL TUNNEL SYNDROME, BILATERAL: Primary | ICD-10-CM

## 2021-12-16 ENCOUNTER — HOSPITAL ENCOUNTER (OUTPATIENT)
Dept: MAMMOGRAPHY | Facility: HOSPITAL | Age: 71
Discharge: HOME | End: 2021-12-16
Attending: SPECIALIST
Payer: MEDICARE

## 2021-12-16 DIAGNOSIS — R92.2 INCONCLUSIVE MAMMOGRAM: ICD-10-CM

## 2021-12-16 PROCEDURE — 76641 ULTRASOUND BREAST COMPLETE: CPT | Performed by: SPECIALIST

## 2021-12-27 ENCOUNTER — ORDER TRANSCRIPTION (OUTPATIENT)
Dept: PHYSICAL THERAPY | Facility: HOSPITAL | Age: 71
End: 2021-12-27

## 2021-12-27 DIAGNOSIS — M54.12 RADICULOPATHY OF CERVICAL SPINE: Primary | ICD-10-CM

## 2022-01-13 ENCOUNTER — TELEPHONE (OUTPATIENT)
Dept: PHYSICAL THERAPY | Facility: HOSPITAL | Age: 72
End: 2022-01-13

## 2022-01-14 ENCOUNTER — OFFICE VISIT (OUTPATIENT)
Dept: PHYSICAL THERAPY | Facility: HOSPITAL | Age: 72
End: 2022-01-14
Attending: STUDENT IN AN ORGANIZED HEALTH CARE EDUCATION/TRAINING PROGRAM
Payer: MEDICARE

## 2022-01-14 DIAGNOSIS — M54.12 RADICULOPATHY OF CERVICAL SPINE: ICD-10-CM

## 2022-01-14 PROCEDURE — 97161 PT EVAL LOW COMPLEX 20 MIN: CPT

## 2022-01-14 PROCEDURE — 97140 MANUAL THERAPY 1/> REGIONS: CPT

## 2022-01-14 NOTE — PROGRESS NOTES
SPINE EVALUATION:   Referring Physician: Dr. Bharathi Hobson  Diagnosis: Radiculopathy of cervical spine (M54.12)       Date of Service: 1/14/2022     PATIENT Jennifer Pierre is a 73year old RHD female who presents to therapy today with complaints of L voiced understanding and performs HEP correctly without reported pain. Skilled Physical Therapy is medically necessary to address the above impairments and reach functional goals.      Precautions:  None  OBJECTIVE:   Observation/Posture: slight FHP  Neuro Patient will demonstrate 75 deg active cervical rotation in order to drive safely within 4 weeks. Patient will demonstrate normal light touch sensation in the LUE within 4 weeks.   Patient will achieve PEDRO in FOTO score within 4 weeks in order to improve

## 2022-01-17 ENCOUNTER — OFFICE VISIT (OUTPATIENT)
Dept: PHYSICAL THERAPY | Facility: HOSPITAL | Age: 72
End: 2022-01-17
Attending: STUDENT IN AN ORGANIZED HEALTH CARE EDUCATION/TRAINING PROGRAM
Payer: MEDICARE

## 2022-01-17 DIAGNOSIS — M54.12 RADICULOPATHY OF CERVICAL SPINE: ICD-10-CM

## 2022-01-17 PROCEDURE — 97110 THERAPEUTIC EXERCISES: CPT

## 2022-01-17 PROCEDURE — 97140 MANUAL THERAPY 1/> REGIONS: CPT

## 2022-01-17 NOTE — PROGRESS NOTES
Dx: Radiculopathy of cervical spine (M54.12)         Insurance (Authorized # of Visits):  pending           Authorizing Physician: Dr. La Cisse  Next MD visit: none scheduled  Fall Risk: standard         Precautions: n/a             Subjective: Patient repo

## 2022-01-19 ENCOUNTER — OFFICE VISIT (OUTPATIENT)
Dept: PHYSICAL THERAPY | Facility: HOSPITAL | Age: 72
End: 2022-01-19
Attending: STUDENT IN AN ORGANIZED HEALTH CARE EDUCATION/TRAINING PROGRAM
Payer: MEDICARE

## 2022-01-19 DIAGNOSIS — M54.12 RADICULOPATHY OF CERVICAL SPINE: ICD-10-CM

## 2022-01-19 PROCEDURE — 97140 MANUAL THERAPY 1/> REGIONS: CPT

## 2022-01-19 PROCEDURE — 97110 THERAPEUTIC EXERCISES: CPT

## 2022-01-19 NOTE — PROGRESS NOTES
Dx: Radiculopathy of cervical spine (M54.12)         Insurance (Authorized # of Visits):  pending           Authorizing Physician: Dr. Toan Otoole  Next MD visit: none scheduled  Fall Risk: standard         Precautions: n/a             Subjective: Patient repo mobilization, [id number]  Doorway pec stretch, [id number]                    Manual:  Prone L C4-6 UPA, gr III+, 10 min  Upper trap STM, 5 min  1st rib inf glide, 5 min  Cervical traction, 5 min Manual:  Prone L T1-4 UPA, gr III, 10 min  1st rib inf glide, 5 min

## 2022-01-24 ENCOUNTER — APPOINTMENT (OUTPATIENT)
Dept: PHYSICAL THERAPY | Facility: HOSPITAL | Age: 72
End: 2022-01-24
Attending: STUDENT IN AN ORGANIZED HEALTH CARE EDUCATION/TRAINING PROGRAM
Payer: MEDICARE

## 2022-01-26 ENCOUNTER — APPOINTMENT (OUTPATIENT)
Dept: PHYSICAL THERAPY | Facility: HOSPITAL | Age: 72
End: 2022-01-26
Attending: STUDENT IN AN ORGANIZED HEALTH CARE EDUCATION/TRAINING PROGRAM
Payer: MEDICARE

## 2022-01-31 ENCOUNTER — OFFICE VISIT (OUTPATIENT)
Dept: PHYSICAL THERAPY | Facility: HOSPITAL | Age: 72
End: 2022-01-31
Attending: STUDENT IN AN ORGANIZED HEALTH CARE EDUCATION/TRAINING PROGRAM
Payer: MEDICARE

## 2022-01-31 DIAGNOSIS — M54.12 RADICULOPATHY OF CERVICAL SPINE: ICD-10-CM

## 2022-01-31 PROCEDURE — 97140 MANUAL THERAPY 1/> REGIONS: CPT

## 2022-01-31 PROCEDURE — 97110 THERAPEUTIC EXERCISES: CPT

## 2022-01-31 NOTE — PROGRESS NOTES
Dx: Radiculopathy of cervical spine (M54.12)         Insurance (Authorized # of Visits):  pending           Authorizing Physician: Dr. Kathy Lacy  Next MD visit: none scheduled  Fall Risk: standard         Precautions: n/a             Subjective: Patient repo 2#, 10  Seated 1st rib self mobilization, [id number]  Doorway pec stretch, [id number] TE  S/l ER, 2#, 10  Finger web, 3 min  Standing median nerve glides against wall, 2x10  Thoracic extension against wall, 20    Manual:  Carpal mobilizations, 8 min  Supine medi

## 2022-02-02 ENCOUNTER — APPOINTMENT (OUTPATIENT)
Dept: PHYSICAL THERAPY | Facility: HOSPITAL | Age: 72
End: 2022-02-02
Attending: STUDENT IN AN ORGANIZED HEALTH CARE EDUCATION/TRAINING PROGRAM
Payer: MEDICARE

## 2022-02-07 ENCOUNTER — OFFICE VISIT (OUTPATIENT)
Dept: PHYSICAL THERAPY | Facility: HOSPITAL | Age: 72
End: 2022-02-07
Attending: STUDENT IN AN ORGANIZED HEALTH CARE EDUCATION/TRAINING PROGRAM
Payer: MEDICARE

## 2022-02-07 DIAGNOSIS — M54.12 RADICULOPATHY OF CERVICAL SPINE: ICD-10-CM

## 2022-02-07 PROCEDURE — 97110 THERAPEUTIC EXERCISES: CPT

## 2022-02-07 PROCEDURE — 97140 MANUAL THERAPY 1/> REGIONS: CPT

## 2022-02-07 NOTE — PROGRESS NOTES
Dx: Radiculopathy of cervical spine (M54.12)         Insurance (Authorized # of Visits):  pending           Authorizing Physician: Dr. Mariano Courser  Next MD visit: none scheduled  Fall Risk: standard         Precautions: n/a             Subjective: Patient reports that numbness in her palm is gone but that it is still present in her 1-3 fingers but possibly a bit improved since previous session. Pain: 2/10      Objective:   Cervical rotation AROM: R 68 deg; L 63 deg    Median nerve ULTT positive for reproduction of hand symptoms  Weakness of R flexor pollicis longus 4-/5; L 5/5    HEP:  Access Code: [email or web address]    Exercises  Median Nerve Flossing - Tray - 1 x daily - 7 x weekly - 1 sets - 20 reps  Quick Finger Spreading with Rubber Band - 1 x daily - 7 x weekly - 2 sets - 1 min hold  Sidelying Shoulder ER with Towel and Dumbbell - 1 x daily - 7 x weekly - 2 sets - 10 reps        Assessment: Patient had decreased thumb symptoms following 1st rib inf gliding but no change in finger symptoms. She is going to continue HEP as prescribed until she has her EMG. Will follow up at that time to determine need for further PT. Goals:   Goals: (to be met in 8 visits)   Patient will demonstrate 75 deg active cervical rotation in order to drive safely within 4 weeks. Patient will demonstrate normal light touch sensation in the LUE within 4 weeks. Patient will achieve PEDRO in FOTO score within 4 weeks in order to improve functional mobility. Plan: continue PT to  LUE numbness  Date: 2022  TX#: 2 Date: 22                TX#: 3 Date: 22                TX#: 4 Date: 22                 TX#: 5 Date:    Tx#: 6/   TE  Seated cervical snag, 10  Dual cable row, 10#, 2x12  Wall serratus punch, 15  Quadruped thoracic rotation, 15 TE  Supine thoracic extension over towel  Seated tree hug thoracic flexion   S/l horiz abduction, 2#, 2x8  S/l ER, 2#, 10  Seated 1st rib self mobilization, [id number]  Doorway pec stretch, [id number] TE  S/l ER, 2#, 10  Finger web, 3 min  Standing median nerve glides against wall, 2x10  Thoracic extension against wall, 20    Manual:  Carpal mobilizations, 8 min  Supine median nerve gliding, 8 min  Palm stretching/mobilization, 7 min   TE  S/l ER, 2#, 10  Standing median nerve glides against wall, 2x10  Resisted thumb extension      Manual:  Carpal mobilizations, 8 min  Supine median nerve gliding, 8 min  1st rib inf glide, 7 min                  Manual:  Prone L C4-6 UPA, gr III+, 10 min  Upper trap STM, 5 min  1st rib inf glide, 5 min  Cervical traction, 5 min Manual:  Prone L T1-4 UPA, gr III, 10 min  1st rib inf glide, 5 min        Charges: 2 manual, TE      Total Timed Treatment: 40 min  Total Treatment Time: 40 min  3

## 2022-03-15 ENCOUNTER — OFFICE VISIT (OUTPATIENT)
Dept: ELECTROPHYSIOLOGY | Facility: HOSPITAL | Age: 72
End: 2022-03-15
Attending: SPECIALIST
Payer: MEDICARE

## 2022-03-15 DIAGNOSIS — G56.03 CARPAL TUNNEL SYNDROME, BILATERAL: ICD-10-CM

## 2022-03-15 PROCEDURE — 95886 MUSC TEST DONE W/N TEST COMP: CPT | Performed by: OTHER

## 2022-03-15 PROCEDURE — 95911 NRV CNDJ TEST 9-10 STUDIES: CPT | Performed by: OTHER

## 2022-03-15 NOTE — PROCEDURES
[facility]  Nerve Conduction & EMG Report                      Ben Barbosa, [address]   EMG department   [address], [address]  [phone]          Patient name: Deborah Trammell  YOB: 1950  Referring provider: Dr. Pacheco Díaz  Reason for study: For mononeuropathy  Brief history: 73year old female with several month history of primarily left hand paresthesias. Sensory and motor nerve conduction studies, and needle EMG:  Please see separate sheet for waveforms and quantitative nerve conduction data, as well as for tabulated form of electromyographic data. Summary:   1. The left median sensory response was absent. The right median sensory response was normal.  2.  The bilateral ulnar and left radial sensory responses were normal.  3.  The left median motor response had very low amplitude, and prolonged distal latency. Conduction velocity was normal.  The right median motor response was normal.  4.  The bilateral ulnar motor responses were normal.  5.  Needle EMG using a concentric needle was performed on selected muscles of the left upper extremity. The left opponens pollicis had positive sharp waves and fibrillations, and no chronic remodeling changes were seen. Conclusion:  1. 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 2.  There is no evidence of a cervical radiculopathy.       Ben Barbosa, DO  Neurology and Neuromuscular medicine  [facility]

## 2023-03-23 ENCOUNTER — HOSPITAL ENCOUNTER (OUTPATIENT)
Dept: MAMMOGRAPHY | Age: 73
Discharge: HOME | End: 2023-03-23
Attending: SPECIALIST
Payer: MEDICARE

## 2023-03-23 DIAGNOSIS — Z12.31 VISIT FOR SCREENING MAMMOGRAM: ICD-10-CM

## 2023-03-23 PROCEDURE — 77067 SCR MAMMO BI INCL CAD: CPT | Performed by: SPECIALIST

## 2023-03-23 PROCEDURE — 77063 BREAST TOMOSYNTHESIS BI: CPT | Performed by: SPECIALIST

## 2023-08-07 ENCOUNTER — OFFICE VISIT (OUTPATIENT)
Dept: ORTHOPEDICS CLINIC | Facility: CLINIC | Age: 73
End: 2023-08-07
Payer: COMMERCIAL

## 2023-08-07 VITALS — BODY MASS INDEX: 22.97 KG/M2 | HEIGHT: 62.5 IN | WEIGHT: 128 LBS

## 2023-08-07 DIAGNOSIS — M65.4 TENOSYNOVITIS OF RADIAL STYLOID: Primary | ICD-10-CM

## 2023-08-07 PROCEDURE — 3008F BODY MASS INDEX DOCD: CPT | Performed by: ORTHOPAEDIC SURGERY

## 2023-08-07 PROCEDURE — 99203 OFFICE O/P NEW LOW 30 MIN: CPT | Performed by: ORTHOPAEDIC SURGERY

## 2023-10-25 ENCOUNTER — OFFICE VISIT (OUTPATIENT)
Dept: ORTHOPEDICS CLINIC | Facility: CLINIC | Age: 73
End: 2023-10-25

## 2023-10-25 VITALS — BODY MASS INDEX: 22.97 KG/M2 | WEIGHT: 128 LBS | HEIGHT: 62.5 IN

## 2023-10-25 DIAGNOSIS — M65.4 TENOSYNOVITIS OF RADIAL STYLOID: Primary | ICD-10-CM

## 2023-10-25 PROCEDURE — 99213 OFFICE O/P EST LOW 20 MIN: CPT | Performed by: ORTHOPAEDIC SURGERY

## 2023-10-25 PROCEDURE — 3008F BODY MASS INDEX DOCD: CPT | Performed by: ORTHOPAEDIC SURGERY

## 2023-10-25 PROCEDURE — 20550 NJX 1 TENDON SHEATH/LIGAMENT: CPT | Performed by: ORTHOPAEDIC SURGERY

## 2023-10-25 RX ORDER — BETAMETHASONE SODIUM PHOSPHATE AND BETAMETHASONE ACETATE 3; 3 MG/ML; MG/ML
6 INJECTION, SUSPENSION INTRA-ARTICULAR; INTRALESIONAL; INTRAMUSCULAR; SOFT TISSUE ONCE
Status: COMPLETED | OUTPATIENT
Start: 2023-10-25 | End: 2023-10-25

## 2023-10-25 RX ADMIN — BETAMETHASONE SODIUM PHOSPHATE AND BETAMETHASONE ACETATE 6 MG: 3; 3 INJECTION, SUSPENSION INTRA-ARTICULAR; INTRALESIONAL; INTRAMUSCULAR; SOFT TISSUE at 10:26:00

## 2024-07-03 ENCOUNTER — HOSPITAL ENCOUNTER (OUTPATIENT)
Dept: MRI IMAGING | Age: 74
Discharge: HOME | End: 2024-07-03
Attending: SPECIALIST
Payer: MEDICARE

## 2024-07-03 DIAGNOSIS — M26.629 TEMPOROMANDIBULAR JOINT (TMJ) PAIN: ICD-10-CM

## 2024-07-03 PROCEDURE — 70336 MAGNETIC IMAGE JAW JOINT: CPT | Performed by: SPECIALIST

## 2024-09-22 NOTE — PROGRESS NOTES
Dx: L TKA         Authorized # of Visits:  6 add'l approved. Next MD visit: 12/9/20  Fall Risk: standard         Precautions: Parkinson's dx. .           Subjective:   Rates left knee soreness rated 2/10/1his morning.     Is dealing with tooth pain tod comprehensive HEP to maintain progress achieved in PT.  Met. Plan: Continue skilled Physical Therapy. Treatment will continue to include:  PT to improve strength, ROM, and return to PLOF        Date: 11/10/2020  Tx#: 2/12 Date:    Tx#: 3/12 Date: 11/ L.  X 20 with fatigue reported. X 20 L. X.    Tandem stance, 2 min L KF with SB and strap use x 10 reps. .  DKTC with SB x 10. DKTC with SB x 10. L KF with heel oh SB with strap DKTC with SB 2 x 10. DKTC with SB x 20 reps. . X 20 reps. . X 20.  DKTC with Marching x 10 l/r. PSD x 10 l/r. A/P x 20.   4\" and 6\" LSU L x 10. Prone active L KF and then prone hang for 2 minutes. L CKC with red band for 3 seconds x 2 each. Wide balance board a/p x 25.   6\" LSU x 10.   Airex marching with light HHA x 20 (2) Patient Placed in Bed

## 2024-12-23 ENCOUNTER — LAB ENCOUNTER (OUTPATIENT)
Dept: LAB | Age: 74
End: 2024-12-23
Attending: SPECIALIST
Payer: MEDICARE

## 2024-12-23 DIAGNOSIS — R05.9 COUGH: ICD-10-CM

## 2024-12-23 DIAGNOSIS — R10.2 PELVIC PAIN: Primary | ICD-10-CM

## 2024-12-23 LAB — PROT UR-MCNC: <6 MG/DL (ref ?–14)

## 2024-12-23 PROCEDURE — 86335 IMMUNFIX E-PHORSIS/URINE/CSF: CPT

## 2024-12-23 PROCEDURE — 84156 ASSAY OF PROTEIN URINE: CPT

## 2024-12-23 PROCEDURE — 84166 PROTEIN E-PHORESIS/URINE/CSF: CPT

## 2024-12-26 ENCOUNTER — APPOINTMENT (OUTPATIENT)
Dept: LAB | Age: 74
End: 2024-12-26
Attending: SPECIALIST
Payer: MEDICARE

## 2024-12-26 ENCOUNTER — HOSPITAL ENCOUNTER (OUTPATIENT)
Dept: GENERAL RADIOLOGY | Age: 74
Discharge: HOME | End: 2024-12-26
Attending: SPECIALIST
Payer: MEDICARE

## 2024-12-26 ENCOUNTER — HOSPITAL ENCOUNTER (OUTPATIENT)
Dept: BONE DENSITY | Age: 74
Discharge: HOME | End: 2024-12-26
Attending: SPECIALIST
Payer: MEDICARE

## 2024-12-26 ENCOUNTER — HOSPITAL ENCOUNTER (OUTPATIENT)
Dept: MAMMOGRAPHY | Age: 74
Discharge: HOME | End: 2024-12-26
Attending: SPECIALIST
Payer: MEDICARE

## 2024-12-26 DIAGNOSIS — Z12.31 ENCOUNTER FOR SCREENING MAMMOGRAM FOR MALIGNANT NEOPLASM OF BREAST: ICD-10-CM

## 2024-12-26 DIAGNOSIS — M81.0 OSTEOPOROSIS: ICD-10-CM

## 2024-12-26 DIAGNOSIS — R05.9 COUGH: ICD-10-CM

## 2024-12-26 PROCEDURE — 77080 DXA BONE DENSITY AXIAL: CPT | Performed by: SPECIALIST

## 2024-12-26 PROCEDURE — 71046 X-RAY EXAM CHEST 2 VIEWS: CPT | Performed by: SPECIALIST

## 2024-12-26 PROCEDURE — 77067 SCR MAMMO BI INCL CAD: CPT | Performed by: SPECIALIST

## 2024-12-26 PROCEDURE — 77063 BREAST TOMOSYNTHESIS BI: CPT | Performed by: SPECIALIST

## 2025-01-17 ENCOUNTER — HOSPITAL ENCOUNTER (OUTPATIENT)
Dept: ULTRASOUND IMAGING | Age: 75
Discharge: HOME | End: 2025-01-17
Attending: SPECIALIST
Payer: MEDICARE

## 2025-01-17 DIAGNOSIS — R63.4 LOSS OF WEIGHT: ICD-10-CM

## 2025-01-17 DIAGNOSIS — R10.2 PELVIC PAIN SYNDROME: ICD-10-CM

## 2025-01-17 PROCEDURE — 76830 TRANSVAGINAL US NON-OB: CPT | Performed by: SPECIALIST

## 2025-01-17 PROCEDURE — 76856 US EXAM PELVIC COMPLETE: CPT | Performed by: SPECIALIST

## 2025-01-23 NOTE — ED NOTES
MD at bedside.
Patient to CT via stretcher, patient in stable condition
Patient's family requesting for xrays to be cancelled, MD notified, MD at bedside speaking with patient and family.
[FreeTextEntry8] : 2 weeks of bilateral conjunctivitis.  Bulbar and palpable conjunctiva are injected there is thick gummy discharge in the morning eyes feel slightly iván there is no visual changes.  Physical exam corneas are clear.  Will treat with Cipro ophthalmic drops referral to ophthalmology if fails to improve  Hidradenitis right axilla continues to drain small amounts is very painful currently on buprenorphine patch 7.5 mg for pain management  Sees hematology for chronic iron deficiency anemia receives iron infusions and Aranesp intermittently.  Also has minor renal insufficiency  Obesity has lost 70 pounds on Zepbound 10 mg would like to continue it

## (undated) DEVICE — STERILE POLYISOPRENE POWDER-FREE SURGICAL GLOVES WITH EMOLLIENT COATING: Brand: PROTEXIS

## (undated) DEVICE — MLPD DISPOSABLE PAD (6' ROLL) 3 ROLLS: Brand: SCHAERER MEDICAL USA

## (undated) DEVICE — ZIMMER® STERILE DISPOSABLE TOURNIQUET CUFF WITH PLC, DUAL PORT, SINGLE BLADDER, 34 IN. (86 CM)

## (undated) DEVICE — BOWL CEMENT MIX QUICK-VAC

## (undated) DEVICE — KENDALL SCD EXPRESS SLEEVES, KNEE LENGTH, MEDIUM: Brand: KENDALL SCD

## (undated) DEVICE — 2T11 #2 PDO 36 X 36: Brand: 2T11 #2 PDO 36 X 36

## (undated) DEVICE — STOCK ORTH TUB 72X8IN NLTX

## (undated) DEVICE — UNIVERSAL STERIBUMP® STERILE (5/CASE): Brand: UNIVERSAL STERIBUMP®

## (undated) DEVICE — SOL  .9 1000ML BTL

## (undated) DEVICE — TOTAL KNEE CDS: Brand: MEDLINE INDUSTRIES, INC.

## (undated) DEVICE — NEEDLE SPINAL 18X3-1/2 PINK.

## (undated) DEVICE — STERILE POLYISOPRENE POWDER-FREE SURGICAL GLOVES: Brand: PROTEXIS

## (undated) DEVICE — SYRINGE 30ML LL TIP

## (undated) DEVICE — Device: Brand: STABLECUT®

## (undated) DEVICE — CHLORAPREP 26ML APPLICATOR

## (undated) DEVICE — SPECIMEN CONTAINER,POSITIVE SEAL INDICATOR, OR PACKAGED: Brand: PRECISION

## (undated) DEVICE — PROXIMATE SKIN STAPLERS (35 WIDE) CONTAINS 35 STAINLESS STEEL STAPLES (FIXED HEAD): Brand: PROXIMATE

## (undated) DEVICE — DECANTER BAG 9": Brand: MEDLINE INDUSTRIES, INC.

## (undated) DEVICE — SUTURE VICRYL 2-0 FSL

## (undated) DEVICE — GOWN,SIRUS,FABRIC-REINFORCED,X-LARGE: Brand: MEDLINE

## (undated) DEVICE — LIGHT HANDLE

## (undated) DEVICE — PSI PSN PREF CR PIN GUIDE

## (undated) DEVICE — BANDAGE ROLL,100% COTTON, 6 PLY, LARGE: Brand: KERLIX

## (undated) DEVICE — WRAP COOLING KNEE W/ICE PILLOW

## (undated) NOTE — ED AVS SNAPSHOT
[facility] Emergency Department    [place]  [address] [address]    Phone:  [phone]    Fax:  [facility]   MRN: [id number]    Department:  [facility] Emergency Department   Date of Visit:  2/15/2 You should not take this drug for forty-eight (48) hours after this Imaging study. DO NOT begin taking this drug again until you have contacted the physician who ordered this medication.     Your physician may need to evaluate your kidney function before y primary care or specialist physician will see patients referred from the [facility] Emergency Department. Follow-up care is at the discretion of that Physician.     IF THERE IS ANY CHANGE OR WORSENING OF YOUR CONDITION, CALL YOUR PRIMARY CARE PHYSICIAN - If you have concerns related to behavioral health issues or thoughts of harming yourself, contact [address] [place] [address] at [phone].     - If you don’t have insurance, [facility] has partnered with Patient [employer] Polly SKULL:             Small right orbital hematoma, no underlying fracture. OTHER:             None. CT FACIAL BONES (MEC=99846) (Final result) Result time:  02/15/17 23:10:42    Final result    Impression:    CONCLUSION:    1.  No displaced fra INDICATIONS:  fall/head inj, neck pain     TECHNIQUE:  Noncontrast CT scanning of the cervical spine is performed from the skull base through C7. Multiplanar reconstructions are generated. Dose reduction techniques were used.  Dose information is transmit

## (undated) NOTE — MR AVS SNAPSHOT
After Visit Summary   3/15/2022    Jag Chisholm   MRN: [id number]           Visit Information     Date & Time  3/15/2022  2:00 PM Provider  Paulette Stephens, [address] Dept. Phone  [phone]      Allergies as of 3/15/2022  Review status set to Review Complete on 1/13/2022   No Known Allergies     Your Current Medications        Dosage    amoxicillin 500 MG Oral Cap amoxicillin 500 mg capsule   TAKE 4 CAPSULES BY MOUTH ONE HOUR PRIOR TO APPOINTMENT    Multiple Vitamin (ONE-DAILY MULTI VITAMINS) Oral Tab Take 1 tablet by mouth daily. Levothyroxine Sodium 125 MCG Oral Tab Take 62.5 mcg by mouth before breakfast.    metFORMIN HCl 500 MG Oral Tab Take 500 mg by mouth 2 (two) times daily with meals. Calcium Carbonate 600 MG Oral Tab Take 600 mg by mouth daily. Pravastatin Sodium 40 MG Oral Tab Take 40 mg by mouth nightly. carbidopa-levodopa  MG Oral Tab Take 2 tablets by mouth 3 (three) times daily. Pramipexole Dihydrochloride 0.25 MG Oral Tab Take 0.25 mg by mouth 3 (three) times daily. Diagnoses for This Visit    Carpal tunnel syndrome, bilateral   [927312]             We Ordered the Following     Normal Orders This Visit    EMG [NEU5 CUSTOM]       Future Appointments        Provider Department    4/27/2022 2:00 PM Gabriella Sanchez Internal Medicine - Natalie Quezada                Did you know that [facility] primary care physicians now offer Video Visits through [address] for adult patients for a variety of conditions such as allergies, back pain and cold symptoms? Skip the drive and waiting room and online chat with a doctor face-to-face using your web-cam enabled computer or mobile device wherever you are. Video Visits cost $50 and can be paid hassle-free using a credit, debit, or health savings card. Not active on [employer]? Ask us how to get signed up today!           If you receive a survey from [employer], please take a few minutes to complete it and provide feedback. We strive to deliver the best patient experience and are looking for ways to make improvements. Your feedback will help us do so. For more information on [employer], please visit [email or web address]           No text in SmartText           No text in SmartText

## (undated) NOTE — IP AVS SNAPSHOT
Patient Demographics     Address  [facility] [place] [address] Phone  [phone] [facility])  [phone] (Mobile) *Preferred* E-mail Address  [email or web address]      Emergency Contact(s)     Name Relation Home Work Mobile    Matheus Malone ? Do not drive until cleared by surgeon. This is usually in four to six weeks after surgery. Discuss at follow-up office visit. ? Not allowed while taking narcotic pain medication or muscle relaxants. Sex  ?  Usually allowed after four to six weeks – c ? You will bleed easier and bruise easier while on these medications. ? Usually you will be on a blood thinner for about 2-3 weeks. ? Contact your physician if you have signs of bruising, nose bleeds or blood in your urine.  Use electric razors and sof ? Deep breathing and relaxation techniques and distractions can help! If you focus on something else, you do not experience the pain the same. Take advantage of everything available to your to help control you discomfort. ?  Contact physician if discomfor ? Schedule one week follow up after surgery WITH PRIMARY CARE PHYSICIAN; review your medications over last 6 months.  Your body gets stressed by surgery and that stress can affect all your other health issues (such as high blood pressure, diabetes, CHF, meron ? If traveling by plane, BEFORE you get into a security line, let them know that you had your hip replaced, as you will most likely set off the metal detector. The doctors no longer provide an identification card for this as they are easily copied.  ALSO re Take 1 tablet (81 mg total) by mouth 2 (two) times daily. Mahesh Corea MD         Calcium Carbonate 600 MG Tabs  Next dose due: TOMORROW AM      Take 600 mg by mouth daily.           carbidopa-levodopa  MG Tabs  Commonly known as: SINEMET  Next [id number] Levothyroxine Sodium tab 62.5 mcg 10/21/20 0545 Given      [id number] Pramipexole Dihydrochloride (MIRAPEX) tab 0.25 mg 10/20/20 1643 Given      [id number] Pramipexole Dihydrochloride (MIRAPEX) tab 0.25 mg 10/21/20 0930 Given      [id number] Pravas Ordering provider: Tino Brower MD  10/20/20 9931 Resulting lab: [address] [facility])    Specimen Information    Type Source Collected On   Blood — 10/21/20 0109          Components    Component Value Reference Range Flag Lab Filed: 10/16/2020 10:50 AM Date of Service: 10/16/2020 10:48 AM Status: Signed    : Farideh Aguirre MD (Physician)       [address]  Orthopedic Surgery  HISTORY AND PHYSICAL EXAMINATION    Patient: [facility]  [facility] Past Medical History:   Diagnosis Date   • Diabetes ([facility] [address]    • Hyperlipidemia    • [facility]    • Thyroid disease       Social History:  Social History    Tobacco Use      Smoking status: Never Smoker      Smokeless tobacco: Never Used    Alc Electronically signed by Sima Malhotra MD on 10/16/2020 10:50 AM   Attribution Gonzales    MA. 1 - Sima Malhotra MD on 10/16/2020 10:48 AM                     D/C Summary    No notes of this type exist for this encounter.         Physical Therapy Notes (l Patient self-stated goal is go to toilet to urinate. OBJECTIVE[CK.1]  Precautions: Other (Comment)(Knee immobilizer until able to do SLR)  Fall Risk: High fall risk[CK. 2]    WEIGHT BEARING RESTRICTION[CK.1]  Weight Bearing Restriction: L lower extremit Approx Degree of Impairment: 46.58%   Standardized Score (AM-PAC Scale): 43.63   CMS Modifier (G-Code): CK[CK.2]    FUNCTIONAL ABILITY STATUS  Gait Assessment[CK. 1]   Gait Assistance: Minimum assistance  Distance (ft): 35  Assistive Device: Rolling walker Patient End of Session: Up in chair;Needs met;Call light within reach;RN aware of session/findings;Bracing education provided; All patient questions and concerns addressed;SCDs in place; Ice applied; Alarm set; Family present(daughter present; KI removed and e Patient is able to demonstrate supine - sit EOB @ level: supervision    Goal #2    Patient is able to demonstrate transfers Sit to/from Stand at assistance level: supervison    Goal #3    Patient is able to ambulate 150 feet with assistive device at [facility] • Diabetes ([facility] [address]    • Hyperlipidemia    • [facility]    • Thyroid disease        Past Surgical History  Past Surgical History:   Procedure Laterality Date   • CYST ASPIRATION RIGHT      right cyst aspiration age 18---chest       OCCUPATIONAL P -   Bathing (including washing, rinsing, drying)?: A Little(supervision)  -   Toileting, which includes using toilet, bedpan or urinal? : A Little(supervision)  -   Putting on and taking off regular upper body clothing?: None  -   Taking care of personal g Patient is a[BW.3 78year old[BW.2] female admitted 10/20/2020 for L TKA. Patient seen this am for OT evauation.  In this OT eval patient performed all ADL tasks, functional transfers and dynamic reaching safely, without loss of balance, and at supervision BW.2 - Andrew Heredia OT on 10/21/2020  8:43 AM                     Video Swallow Study Notes    No notes of this type exist for this encounter. SLP Notes    No notes of this type exist for this encounter.      Immunizations     Name Date      Kaelyn

## (undated) NOTE — ED AVS SNAPSHOT
[facility] Emergency Department    [place]  [address] [place] [address]    Phone:  [phone]    Fax:  [address] [facility]   MRN: [id number]    Department:  [facility] Emergency Department   Date of Visit:  2/15/2 IF THERE IS ANY CHANGE OR WORSENING OF YOUR CONDITION, CALL YOUR PRIMARY CARE PHYSICIAN AT ONCE OR RETURN IMMEDIATELY TO THE EMERGENCY DEPARTMENT.     If you have been prescribed any medication(s), please fill your prescription right away and begin taking t

## (undated) NOTE — MR AVS SNAPSHOT
After Visit Summary   6/8/2021    Jeri Goyal    MRN: [id number]           Visit Information     Date & Time  6/8/2021  2:00 PM Provider  Tino Rios, [address] [place] [address] Neurodiagnostics Dept.  Phone  [phone] Jimmie MURPHY now offers Video Visits through [address] for adult and pediatric patients. Video Visits are available Monday - Friday for many common conditions such as allergies, colds, cough, fever, rash, sore throat, headache and pink eye. 4:00 pm     [address]   Monday – Friday  4:00 pm – 10:00 pm   Saturday – Sunday  10:00 am – 4:00 pm  WALK-IN CARE  Emergency Medicine Providers  Conditions needing urgent attention, but are   non-life-threatening.     Also available by appointment Average